# Patient Record
Sex: MALE | Race: WHITE | NOT HISPANIC OR LATINO | Employment: UNEMPLOYED | URBAN - NONMETROPOLITAN AREA
[De-identification: names, ages, dates, MRNs, and addresses within clinical notes are randomized per-mention and may not be internally consistent; named-entity substitution may affect disease eponyms.]

---

## 2017-01-07 ENCOUNTER — COMMUNICATION - GICH (OUTPATIENT)
Dept: INTERNAL MEDICINE | Facility: OTHER | Age: 57
End: 2017-01-07

## 2017-01-07 DIAGNOSIS — J32.9 CHRONIC SINUSITIS: ICD-10-CM

## 2017-01-07 DIAGNOSIS — F33.2 MAJOR DEPRESSIVE DISORDER, RECURRENT SEVERE WITHOUT PSYCHOTIC FEATURES (H): ICD-10-CM

## 2017-01-09 ENCOUNTER — COMMUNICATION - GICH (OUTPATIENT)
Dept: INTERNAL MEDICINE | Facility: OTHER | Age: 57
End: 2017-01-09

## 2017-01-09 DIAGNOSIS — J32.9 CHRONIC SINUSITIS: ICD-10-CM

## 2017-02-27 ENCOUNTER — HISTORY (OUTPATIENT)
Dept: INTERNAL MEDICINE | Facility: OTHER | Age: 57
End: 2017-02-27

## 2017-02-27 ENCOUNTER — OFFICE VISIT - GICH (OUTPATIENT)
Dept: INTERNAL MEDICINE | Facility: OTHER | Age: 57
End: 2017-02-27

## 2017-02-27 DIAGNOSIS — J32.9 CHRONIC SINUSITIS: ICD-10-CM

## 2017-02-27 DIAGNOSIS — F41.9 ANXIETY DISORDER: ICD-10-CM

## 2017-02-27 DIAGNOSIS — E78.5 HYPERLIPIDEMIA: ICD-10-CM

## 2017-02-27 DIAGNOSIS — F33.2 MAJOR DEPRESSIVE DISORDER, RECURRENT SEVERE WITHOUT PSYCHOTIC FEATURES (H): ICD-10-CM

## 2017-02-27 DIAGNOSIS — F33.42 MAJOR DEPRESSIVE DISORDER, RECURRENT, IN FULL REMISSION (H): ICD-10-CM

## 2017-02-27 DIAGNOSIS — R35.0 FREQUENCY OF MICTURITION: ICD-10-CM

## 2017-02-27 LAB
A/G RATIO - HISTORICAL: 1.6 (ref 1–2)
ABSOLUTE BASOPHILS - HISTORICAL: 0.1 THOU/CU MM
ABSOLUTE EOSINOPHILS - HISTORICAL: 0.2 THOU/CU MM
ABSOLUTE LYMPHOCYTES - HISTORICAL: 2.2 THOU/CU MM (ref 0.9–2.9)
ABSOLUTE MONOCYTES - HISTORICAL: 0.7 THOU/CU MM
ABSOLUTE NEUTROPHILS - HISTORICAL: 6.8 THOU/CU MM (ref 1.7–7)
ALBUMIN SERPL-MCNC: 4.6 G/DL (ref 3.5–5.7)
ALP SERPL-CCNC: 73 IU/L (ref 34–104)
ALT (SGPT) - HISTORICAL: 17 IU/L (ref 7–52)
ANION GAP - HISTORICAL: 14 (ref 5–18)
AST SERPL-CCNC: 17 IU/L (ref 13–39)
BACTERIA URINE: NORMAL BACTERIA/HPF
BASOPHILS # BLD AUTO: 1.2 %
BILIRUB SERPL-MCNC: 0.5 MG/DL (ref 0.3–1)
BILIRUB UR QL: NEGATIVE
BUN SERPL-MCNC: 19 MG/DL (ref 7–25)
BUN/CREAT RATIO - HISTORICAL: 20
CALCIUM SERPL-MCNC: 10 MG/DL (ref 8.6–10.3)
CHLORIDE SERPLBLD-SCNC: 103 MMOL/L (ref 98–107)
CHOL/HDL RATIO - HISTORICAL: 3.55
CHOLESTEROL TOTAL: 199 MG/DL
CLARITY, URINE: CLEAR CLARITY
CO2 SERPL-SCNC: 24 MMOL/L (ref 21–31)
COLOR UR: YELLOW COLOR
CREAT SERPL-MCNC: 0.94 MG/DL (ref 0.7–1.3)
EOSINOPHIL NFR BLD AUTO: 1.6 %
EPITHELIAL CELLS: NORMAL EPI/HPF
ERYTHROCYTE [DISTWIDTH] IN BLOOD BY AUTOMATED COUNT: 12.3 % (ref 11.5–15.5)
GFR IF NOT AFRICAN AMERICAN - HISTORICAL: >60 ML/MIN/1.73M2
GLOBULIN - HISTORICAL: 2.9 G/DL (ref 2–3.7)
GLUCOSE SERPL-MCNC: 102 MG/DL (ref 70–105)
GLUCOSE URINE: NEGATIVE MG/DL
HCT VFR BLD AUTO: 43.2 % (ref 37–53)
HDLC SERPL-MCNC: 56 MG/DL (ref 23–92)
HEMOGLOBIN: 14.6 G/DL (ref 13.5–17.5)
KETONES UR QL: NEGATIVE MG/DL
LDLC SERPL CALC-MCNC: 127 MG/DL
LEUKOCYTE ESTERASE URINE: NEGATIVE
LYMPHOCYTES NFR BLD AUTO: 21.7 % (ref 20–44)
MCH RBC QN AUTO: 31 PG (ref 26–34)
MCHC RBC AUTO-ENTMCNC: 33.8 G/DL (ref 32–36)
MCV RBC AUTO: 92 FL (ref 80–100)
MONOCYTES NFR BLD AUTO: 6.8 %
NEUTROPHILS NFR BLD AUTO: 68.7 % (ref 42–72)
NITRITE UR QL STRIP: NEGATIVE
NON-HDL CHOLESTEROL - HISTORICAL: 143 MG/DL
OCCULT BLOOD,URINE - HISTORICAL: ABNORMAL
PATIENT STATUS - HISTORICAL: ABNORMAL
PH UR: 5.5 [PH]
PLATELET # BLD AUTO: 320 THOU/CU MM (ref 140–440)
PMV BLD: 8.1 FL (ref 6.5–11)
POTASSIUM SERPL-SCNC: 4.4 MMOL/L (ref 3.5–5.1)
PROT SERPL-MCNC: 7.5 G/DL (ref 6.4–8.9)
PROTEIN QUALITATIVE,URINE - HISTORICAL: NEGATIVE MG/DL
RBC - HISTORICAL: NORMAL /HPF
RED BLOOD COUNT - HISTORICAL: 4.71 MIL/CU MM (ref 4.3–5.9)
SODIUM SERPL-SCNC: 141 MMOL/L (ref 133–143)
SP GR UR STRIP: >=1.03
TRIGL SERPL-MCNC: 82 MG/DL
UROBILINOGEN,QUALITATIVE - HISTORICAL: NORMAL EU/DL
WBC - HISTORICAL: NORMAL /HPF
WHITE BLOOD COUNT - HISTORICAL: 9.9 THOU/CU MM (ref 4.5–11)

## 2017-04-11 ENCOUNTER — COMMUNICATION - GICH (OUTPATIENT)
Dept: INTERNAL MEDICINE | Facility: OTHER | Age: 57
End: 2017-04-11

## 2017-04-11 DIAGNOSIS — J32.9 CHRONIC SINUSITIS: ICD-10-CM

## 2018-01-02 NOTE — TELEPHONE ENCOUNTER
Patient Information     Patient Name MRN Beau Giordano [id number] Male 1960      Telephone Encounter by Kiera Cole RN at 2017  8:02 AM     Author:  Kiera Cole RN Service:  (none) Author Type:  NURS- Registered Nurse     Filed:  2017  8:06 AM Encounter Date:  2017 Status:  Signed     :  Kiera Cole RN (NURS- Registered Nurse)            Claritin refilled 16.  Unable to complete prescription refill per RN Medication Refill Policy.................... KIERA COLE RN ....................  2017   8:02 AM        Depression-in adults 18 and over    Office visit in the past 12 months or as indicated in chart.  Should have clinic visit 1-2 months after initial prescription.    Last visit with CASI BOWENS was on: 2015 in [facility]  Next visit with CASI BOWENS is on: No future appointment listed with this provider  Next visit with Internal Medicine is on: No future appointment listed in this department  Patient is due for appointment, letter sent.  Max refills 12 months from last office visit or per providers notes.  Prescription refilled per RN Medication Refill Policy.................... KIERA COLE RN ....................  2017   8:02 AM

## 2018-01-02 NOTE — TELEPHONE ENCOUNTER
Patient Information     Patient Name MRN Beau Giordano [id number] Male 1960      Telephone Encounter by Kiera Cole RN at 2017 12:03 PM     Author:  Kiera Cole RN Service:  (none) Author Type:  NURS- Registered Nurse     Filed:  2017 12:06 PM Encounter Date:  2017 Status:  Signed     :  Kiera Cole RN (NURS- Registered Nurse)            Antihistamines:    Office visit in the past 12 months.    Last visit with CASI BOWENS was on: 2015 in [facility]  Next visit with CASI BOWENS is on: No future appointment listed with this provider  Next visit with Internal Medicine is on: No future appointment listed in this department  Patient needs appointment , letter sent.  Max refills 12 months from last office visit.  Prescription refilled per RN Medication Refill Policy.................... KIERA COLE RN ....................  2017   12:04 PM

## 2018-01-03 NOTE — PROGRESS NOTES
Patient Information     Patient Name MRN Beau Giordano [id number] Male 1960      Progress Notes by Jose David Yen MD at 2017  8:30 AM     Author:  Jose David Yen MD Service:  (none) Author Type:  Physician     Filed:  2017  8:59 AM Encounter Date:  2017 Status:  Signed     :  Jose David Yen MD (Physician)            SUBJECTIVE:    Beau Koch is a 56 y.o. male who presents for comprehensive review of their multiple medical problems and review of medications, renewal of medications and update on necessary health maintenance issues.      HPI Comments: He is here today for a physical. He is feeling well in most respects. His sinuses are bothering him somewhat which does cause him to have some dizziness. He has a hard time getting his over-the-counter nasal spray and his Claritin. For any strength work on that. His other medications remain the same including as well be returned and his cholesterol-lowering medication. He quit smoking about 10 months ago, he was congratulated on that. He is not drinking any alcohol and he is not using any drugs. 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 His mother  within the last year, he had previously been living with her.    He is having some troubles with increased urinary frequency. He wonders what that might be from. He has no other complaints or concerns. I spent time today updating his past medical history, past surgical history, family history and social history. He is up-to-date with all health measures.      No Known Allergies,   Current Outpatient Prescriptions     Medication  Sig     atorvastatin (LIPITOR) 20 mg tablet Take 1 tablet by mouth once daily.     buPROPion (WELLBUTRIN XL) 150 mg Extended-Release tablet TAKE ONE TABLET BY MOUTH ONCE DAILY     loratadine (CLARITIN) 10 mg tablet TAKE ONE TABLET BY MOUTH ONCE DAILY     multivitamin (MVI) tablet Take 1 tablet by mouth once daily.     No current facility-administered  medications for this visit.      Medications have been reviewed by me and are current to the best of my knowledge and ability. ,   Past Medical History      Diagnosis   Date     Alcohol abuse, in remission       Quit at age 26      Cigarette smoker       Quit       Depression       Hyperlipidemia     ,   Patient Active Problem List      Diagnosis Date Noted     Sinusitis 2015     Anxiety 2014     Hyperlipidemia 10/14/2013     Drug abuse 10/14/2013     Major depression, recurrent (HC) 2013     Cigarette smoker 2013   ,   Past Surgical History       Procedure   Laterality Date     Hydrocelectomy        Colonoscopy screening        WNL      and   Social History      Substance Use Topics        Smoking status:  Former Smoker     Packs/day: 0.50     Types: Cigarettes     Quit date: 2016     Smokeless tobacco:  Never Used     Alcohol use  No     Family Status     Relation  Status     Father     COPD, blood clots      Mother     HTN, Hyperlipidemia, osteoporosis, depression      Sister Alive    Breast cancer      Sister Alive    Breast cancer      Sister Alive     Sister Alive     Sister Alive     Sister Alive     Sister Alive     Brother  at age 53    MI      Brother Alive     Brother Alive     Brother Alive     Brother Alive     Social History     Social History        Marital status:  Single     Spouse name: N/A     Number of children:  N/A     Years of education:  N/A     Social History Main Topics         Smoking status:   Former Smoker     Packs/day:  0.50     Types:  Cigarettes     Quit date:  2016     Smokeless tobacco:   Never Used     Alcohol use   No     Drug use:   No      Comment: Methamphetamine.  Quit [id number]      Sexual activity:   Not Asked     Other Topics  Concern     None      Social History Narrative     Single, lived with mother in [place], she is .  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                          "      REVIEW OF SYSTEMS:  Review of Systems   Constitutional: Negative for chills, diaphoresis, fever, malaise/fatigue and weight loss.   HENT: Negative for congestion, ear pain, nosebleeds, sore throat and tinnitus.    Eyes: Negative for blurred vision, double vision, photophobia, pain, discharge and redness.   Respiratory: Negative for cough, hemoptysis, sputum production, shortness of breath and wheezing.    Cardiovascular: Negative for chest pain, palpitations, orthopnea, claudication, leg swelling and PND.   Gastrointestinal: Negative for abdominal pain, blood in stool, constipation, diarrhea, heartburn, nausea and vomiting.   Genitourinary: Positive for frequency. Negative for dysuria, flank pain and hematuria.   Musculoskeletal: Negative for back pain, joint pain, myalgias and neck pain.   Skin: Negative for itching and rash.   Neurological: Positive for dizziness. Negative for tingling, tremors, speech change, loss of consciousness, weakness and headaches.   Psychiatric/Behavioral: Negative for depression, hallucinations, memory loss, substance abuse and suicidal ideas. The patient is not nervous/anxious.        OBJECTIVE:  /76  Pulse 68  Ht 1.645 m (5' 4.75\")  Wt 77.9 kg (171 lb 12.8 oz)  BMI 28.81 kg/m2    EXAM:   Physical Exam   Constitutional: He is oriented to person, place, and time and well-developed, well-nourished, and in no distress. No distress.   HENT:   Head: Normocephalic and atraumatic.   Right Ear: Tympanic membrane and external ear normal.   Left Ear: Tympanic membrane and external ear normal.   Nose: Nose normal.   Mouth/Throat: Oropharynx is clear and moist and mucous membranes are normal. No oropharyngeal exudate.   Chronic left TM perforation   Eyes: Conjunctivae are normal. Pupils are equal, round, and reactive to light. No scleral icterus.   Neck: Normal range of motion. Neck supple. Normal carotid pulses, no hepatojugular reflux and no JVD present. Carotid bruit is not present. " No tracheal deviation and no edema present. No thyroid mass and no thyromegaly present.   Cardiovascular: Normal rate, regular rhythm, normal heart sounds and intact distal pulses.  Exam reveals no gallop and no friction rub.    No murmur heard.  Pulmonary/Chest: Effort normal and breath sounds normal. No respiratory distress. He has no decreased breath sounds. He has no wheezes. He has no rhonchi. He has no rales. He exhibits no tenderness.   Abdominal: Soft. Bowel sounds are normal. He exhibits no distension and no mass. There is no hepatosplenomegaly. There is no tenderness. There is no rebound and no guarding. Hernia confirmed negative in the right inguinal area and confirmed negative in the left inguinal area.   Genitourinary: Rectum normal, prostate normal, testes/scrotum normal and penis normal.   Genitourinary Comments: Left hydrocele   Musculoskeletal: Normal range of motion. He exhibits no edema or tenderness.   Lymphadenopathy:     He has no cervical adenopathy.   Neurological: He is alert and oriented to person, place, and time. He has normal motor skills. He displays normal reflexes. No cranial nerve deficit. He exhibits normal muscle tone. Gait normal. Coordination normal.   Skin: Skin is warm and dry. No rash noted. He is not diaphoretic. No cyanosis or erythema. No pallor. Nails show no clubbing.   Psychiatric: Mood, memory, affect and judgment normal.   Nursing note and vitals reviewed.      ASSESSMENT/PLAN:    ICD-10-CM    1. Hyperlipidemia, unspecified hyperlipidemia type E78.5 atorvastatin (LIPITOR) 20 mg tablet      COMP METABOLIC PANEL      LIPID PANEL   2. Anxiety F41.9    3. Recurrent major depressive disorder, in full remission (HC) F33.42    4. Chronic sinusitis, unspecified location J32.9 fluticasone (50 mcg per actuation) nasal solution (FLONASE)   5. Major depressive disorder, recurrent, severe without psychotic features (HC) F33.2 buPROPion (WELLBUTRIN XL) 150 mg Extended-Release tablet    6. Urine frequency R35.0 URINALYSIS W REFLEX MICROSCOPIC IF POSITIVE      CBC AND DIFFERENTIAL        Plan:  Aside from his hydrocele, his exam today is unremarkable. I have no explanation for his dizzy spells unless this is related to sinus problems. Complete lab is drawn and pending, I will send him a letter with the results. This also includes a urinalysis because of his complaints of urine frequency. He will try the Claritin and Flonase on a regular basis and if his dizziness does not improve he will need to return for reevaluation. Otherwise everything is up-to-date and he will follow up annually. Congratulated on his discontinuance of smoking. He will continue with his chronic lipid-lowering therapy and Wellbutrin therapy.

## 2018-01-03 NOTE — NURSING NOTE
Patient Information     Patient Name MRBeau Benson [id number] Male 1960      Nursing Note by Ana Lehman LPN at 2017  8:30 AM     Author:  Ana Lehman LPN Service:  (none) Author Type:  NURS- Licensed Practical Nurse     Filed:  2017  8:39 AM Encounter Date:  2017 Status:  Signed     :  Ana Lehman LPN (NURS- Licensed Practical Nurse)            The patient is here today to have a yearly check up and get refills on his medications.  Ana Lehman LPN......2017  8:27 AM

## 2018-01-04 NOTE — TELEPHONE ENCOUNTER
Patient Information     Patient Name MRN Beau Giordano [id number] Male 1960      Telephone Encounter by Yohannes Arreguin RPh at 2017  4:13 PM     Author:  Yohannes Arreguin RPh Service:  (none) Author Type:  PHARM- Pharmacist     Filed:  2017  4:16 PM Encounter Date:  2017 Status:  Signed     :  Yohannes Arreguin RPh (PHARM- Pharmacist)            Loratadine 10mg 1 po qd, #60, refill prn

## 2018-01-04 NOTE — TELEPHONE ENCOUNTER
Patient Information     Patient Name MRN Beau Giordano [id number] Male 1960      Telephone Encounter by Jose David Yen MD at 2017  4:40 PM     Author:  Jose David Yen MD Service:  (none) Author Type:  Physician     Filed:  2017  4:40 PM Encounter Date:  2017 Status:  Signed     :  Jose David Yen MD (Physician)            Kami

## 2018-01-27 VITALS
HEART RATE: 68 BPM | BODY MASS INDEX: 28.62 KG/M2 | DIASTOLIC BLOOD PRESSURE: 76 MMHG | HEIGHT: 65 IN | SYSTOLIC BLOOD PRESSURE: 108 MMHG | WEIGHT: 171.8 LBS

## 2018-02-16 ENCOUNTER — DOCUMENTATION ONLY (OUTPATIENT)
Dept: FAMILY MEDICINE | Facility: OTHER | Age: 58
End: 2018-02-16

## 2018-02-16 RX ORDER — FLUTICASONE PROPIONATE 50 MCG
1 SPRAY, SUSPENSION (ML) NASAL DAILY
COMMUNITY
Start: 2017-02-27 | End: 2018-12-31

## 2018-02-16 RX ORDER — DIPHENOXYLATE HYDROCHLORIDE AND ATROPINE SULFATE 2.5; .025 MG/1; MG/1
1 TABLET ORAL DAILY
COMMUNITY
Start: 2015-12-17 | End: 2020-01-09

## 2018-02-16 RX ORDER — LORATADINE 10 MG/1
TABLET ORAL
COMMUNITY
Start: 2017-04-11 | End: 2018-12-31

## 2018-02-16 RX ORDER — ATORVASTATIN CALCIUM 20 MG/1
20 TABLET, FILM COATED ORAL DAILY
COMMUNITY
Start: 2017-02-27 | End: 2018-12-31

## 2018-02-16 RX ORDER — BUPROPION HYDROCHLORIDE 150 MG/1
150 TABLET ORAL DAILY
COMMUNITY
Start: 2017-02-27 | End: 2018-12-31

## 2018-07-24 NOTE — PROGRESS NOTES
Patient Information     Patient Name  Beau Koch MRN  [id number] Sex  Male   1960      Letter by Jose David Yen MD at      Author:  Jose David Yen MD Service:  (none) Author Type:  (none)    Filed:   Encounter Date:  2017 Status:  (Other)           Beau Koch  [address] [facility] [address]          2017    Dear Mr. Koch:    Following are the tests completed during your last clinic visit:    Results for orders placed or performed in visit on 17      COMP METABOLIC PANEL      Result  Value Ref Range    SODIUM 141 133 - 143 mmol/L    POTASSIUM 4.4 3.5 - 5.1 mmol/L    CHLORIDE 103 98 - 107 mmol/L    CO2,TOTAL 24 21 - 31 mmol/L    ANION GAP 14 5 - 18                    GLUCOSE 102 70 - 105 mg/dL    CALCIUM 10.0 8.6 - 10.3 mg/dL    BUN 19 7 - 25 mg/dL    CREATININE 0.94 0.70 - 1.30 mg/dL    BUN/CREAT RATIO           20                    GFR if African American >60 >60 ml/min/1.73m2    GFR if not African American >60 >60 ml/min/1.73m2    ALBUMIN 4.6 3.5 - 5.7 g/dL    PROTEIN,TOTAL 7.5 6.4 - 8.9 g/dL    GLOBULIN                  2.9 2.0 - 3.7 g/dL    A/G RATIO 1.6 1.0 - 2.0                    BILIRUBIN,TOTAL 0.5 0.3 - 1.0 mg/dL    ALK PHOSPHATASE 73 34 - 104 IU/L    ALT (SGPT) 17 7 - 52 IU/L    AST (SGOT) 17 13 - 39 IU/L   LIPID PANEL      Result  Value Ref Range    CHOLESTEROL,TOTAL 199 <200 mg/dL    TRIGLYCERIDES 82 <150 mg/dL    HDL CHOLESTEROL 56 23 - 92 mg/dL    NON-HDL CHOLESTEROL 143 <145 mg/dl    CHOL/HDL RATIO            3.55 <4.50                    LDL CHOLESTEROL 127 (H) <100 mg/dL    PATIENT STATUS            NON-FASTING                   CBC WITH AUTO DIFFERENTIAL      Result  Value Ref Range    WHITE BLOOD COUNT         9.9 4.5 - 11.0 thou/cu mm    RED BLOOD COUNT           4.71 4.30 - 5.90 mil/cu mm    HEMOGLOBIN                14.6 13.5 - 17.5 g/dL    HEMATOCRIT                43.2 37.0 - 53.0 %    MCV                       92 80 - 100 fL    MCH                        31.0 26.0 - 34.0 pg    MCHC                      33.8 32.0 - 36.0 g/dL    RDW                       12.3 11.5 - 15.5 %    PLATELET COUNT            320 140 - 440 thou/cu mm    MPV                       8.1 6.5 - 11.0 fL    NEUTROPHILS               68.7 42.0 - 72.0 %    LYMPHOCYTES               21.7 20.0 - 44.0 %    MONOCYTES                 6.8 <12.0 %    EOSINOPHILS               1.6 <8.0 %    BASOPHILS                 1.2 <3.0 %    ABSOLUTE NEUTROPHILS      6.8 1.7 - 7.0 thou/cu mm    ABSOLUTE LYMPHOCYTES      2.2 0.9 - 2.9 thou/cu mm    ABSOLUTE MONOCYTES        0.7 <0.9 thou/cu mm    ABSOLUTE EOSINOPHILS      0.2 <0.5 thou/cu mm    ABSOLUTE BASOPHILS        0.1 <0.3 thou/cu mm   URINALYSIS W REFLEX MICROSCOPIC IF POSITIVE      Result  Value Ref Range    COLOR                     Yellow Yellow Color    CLARITY                   Clear Clear Clarity    SPECIFIC GRAVITY,URINE    >=1.030 (A) 1.010, 1.015, 1.020, 1.025                    PH,URINE                  5.5 6.0, 7.0, 8.0, 5.5, 6.5, 7.5, 8.5                    UROBILINOGEN,QUALITATIVE  Normal Normal EU/dl    PROTEIN, URINE Negative Negative mg/dL    GLUCOSE, URINE Negative Negative mg/dL    KETONES,URINE             Negative Negative mg/dL    BILIRUBIN,URINE           Negative Negative                    OCCULT BLOOD,URINE        Trace (A) Negative                    NITRITE                   Negative Negative                    LEUKOCYTE ESTERASE        Negative Negative                   URINALYSIS MICROSCOPIC      Result  Value Ref Range    RBC 0-2 0-2, None Seen /HPF    WBC None Seen 0-2, 3-5, None Seen /HPF    BACTERIA                  None Seen None Seen, Rare, Occasional, Few Bacteria/HPF    EPITHELIAL CELLS          None Seen None Seen, Few Epi/HPF         Your blood and urine tests look great. Congratulations on this excellent report. If you are still having troubles with dizziness or other problems, be sure to return so that we can  review that further.    Sincerely,      Jose David Yen MD  Internal Medicine  [facility]

## 2018-07-24 NOTE — PROGRESS NOTES
Patient Information     Patient Name  Beau Koch MRN  [id number] Sex  Male   1960      Letter by Jose David Yen MD at      Author:  Jose David eYn MD Service:  (none) Author Type:  (none)    Filed:   Encounter Date:  2017 Status:  (Other)           Beau Koch  [address] [place] [address]          2017    Dear Mr. Koch:    A refill of     loratadine (CLARITIN) 10 mg tablet has been called into your pharmacy.    Additional refills require an appointment with Jose David Yen MD  Please call the clinic at [phone] to schedule your appointment.    Thank you,    The Refill Nurse  [facility]

## 2018-12-31 ENCOUNTER — OFFICE VISIT (OUTPATIENT)
Dept: INTERNAL MEDICINE | Facility: OTHER | Age: 58
End: 2018-12-31
Attending: INTERNAL MEDICINE
Payer: MEDICAID

## 2018-12-31 VITALS
DIASTOLIC BLOOD PRESSURE: 76 MMHG | HEIGHT: 65 IN | WEIGHT: 165.2 LBS | SYSTOLIC BLOOD PRESSURE: 110 MMHG | HEART RATE: 84 BPM | BODY MASS INDEX: 27.52 KG/M2 | RESPIRATION RATE: 18 BRPM

## 2018-12-31 DIAGNOSIS — J32.9 CHRONIC SINUSITIS, UNSPECIFIED LOCATION: ICD-10-CM

## 2018-12-31 DIAGNOSIS — F17.210 CIGARETTE SMOKER: ICD-10-CM

## 2018-12-31 DIAGNOSIS — L91.8 SKIN TAG: ICD-10-CM

## 2018-12-31 DIAGNOSIS — E78.5 HYPERLIPIDEMIA, UNSPECIFIED HYPERLIPIDEMIA TYPE: Primary | ICD-10-CM

## 2018-12-31 DIAGNOSIS — F33.41 RECURRENT MAJOR DEPRESSIVE DISORDER, IN PARTIAL REMISSION (H): ICD-10-CM

## 2018-12-31 DIAGNOSIS — F19.10 DRUG ABUSE (H): ICD-10-CM

## 2018-12-31 LAB
ALBUMIN SERPL-MCNC: 4.6 G/DL (ref 3.5–5.7)
ALP SERPL-CCNC: 59 U/L (ref 34–104)
ALT SERPL W P-5'-P-CCNC: 32 U/L (ref 7–52)
ANION GAP SERPL CALCULATED.3IONS-SCNC: 5 MMOL/L (ref 3–14)
AST SERPL W P-5'-P-CCNC: 21 U/L (ref 13–39)
BILIRUB SERPL-MCNC: 0.5 MG/DL (ref 0.3–1)
BUN SERPL-MCNC: 18 MG/DL (ref 7–25)
CALCIUM SERPL-MCNC: 9.7 MG/DL (ref 8.6–10.3)
CHLORIDE SERPL-SCNC: 103 MMOL/L (ref 98–107)
CHOLEST SERPL-MCNC: 274 MG/DL
CO2 SERPL-SCNC: 31 MMOL/L (ref 21–31)
CREAT SERPL-MCNC: 0.9 MG/DL (ref 0.7–1.3)
ERYTHROCYTE [DISTWIDTH] IN BLOOD BY AUTOMATED COUNT: 13.6 % (ref 10–15)
GFR SERPL CREATININE-BSD FRML MDRD: 87 ML/MIN/{1.73_M2}
GLUCOSE SERPL-MCNC: 86 MG/DL (ref 70–105)
HCT VFR BLD AUTO: 43.2 % (ref 40–53)
HDLC SERPL-MCNC: 58 MG/DL (ref 23–92)
HGB BLD-MCNC: 14.3 G/DL (ref 13.3–17.7)
LDLC SERPL CALC-MCNC: 172 MG/DL
MCH RBC QN AUTO: 31 PG (ref 26.5–33)
MCHC RBC AUTO-ENTMCNC: 33.1 G/DL (ref 31.5–36.5)
MCV RBC AUTO: 94 FL (ref 78–100)
NONHDLC SERPL-MCNC: 216 MG/DL
PLATELET # BLD AUTO: 313 10E9/L (ref 150–450)
POTASSIUM SERPL-SCNC: 4.7 MMOL/L (ref 3.5–5.1)
PROT SERPL-MCNC: 7.3 G/DL (ref 6.4–8.9)
RBC # BLD AUTO: 4.62 10E12/L (ref 4.4–5.9)
SODIUM SERPL-SCNC: 139 MMOL/L (ref 134–144)
TRIGL SERPL-MCNC: 219 MG/DL
WBC # BLD AUTO: 7.8 10E9/L (ref 4–11)

## 2018-12-31 PROCEDURE — 80061 LIPID PANEL: CPT | Performed by: INTERNAL MEDICINE

## 2018-12-31 PROCEDURE — 11200 RMVL SKIN TAGS UP TO&INC 15: CPT | Performed by: INTERNAL MEDICINE

## 2018-12-31 PROCEDURE — 99215 OFFICE O/P EST HI 40 MIN: CPT | Performed by: INTERNAL MEDICINE

## 2018-12-31 PROCEDURE — 85027 COMPLETE CBC AUTOMATED: CPT | Performed by: INTERNAL MEDICINE

## 2018-12-31 PROCEDURE — 80053 COMPREHEN METABOLIC PANEL: CPT | Performed by: INTERNAL MEDICINE

## 2018-12-31 PROCEDURE — G0463 HOSPITAL OUTPT CLINIC VISIT: HCPCS

## 2018-12-31 PROCEDURE — G0472 HEP C SCREEN HIGH RISK/OTHER: HCPCS | Performed by: INTERNAL MEDICINE

## 2018-12-31 PROCEDURE — 36415 COLL VENOUS BLD VENIPUNCTURE: CPT | Performed by: INTERNAL MEDICINE

## 2018-12-31 RX ORDER — ATORVASTATIN CALCIUM 20 MG/1
20 TABLET, FILM COATED ORAL DAILY
Qty: 90 TABLET | Refills: 3 | Status: SHIPPED | OUTPATIENT
Start: 2018-12-31 | End: 2020-01-17

## 2018-12-31 RX ORDER — BUPROPION HYDROCHLORIDE 150 MG/1
150 TABLET ORAL DAILY
Qty: 90 TABLET | Refills: 3 | Status: SHIPPED | OUTPATIENT
Start: 2018-12-31 | End: 2020-10-20

## 2018-12-31 RX ORDER — FLUTICASONE PROPIONATE 50 MCG
1 SPRAY, SUSPENSION (ML) NASAL DAILY
Qty: 3 BOTTLE | Refills: 3 | Status: SHIPPED | OUTPATIENT
Start: 2018-12-31 | End: 2020-01-09

## 2018-12-31 RX ORDER — LORATADINE 10 MG/1
1 TABLET ORAL DAILY
Qty: 90 TABLET | Refills: 3 | Status: SHIPPED | OUTPATIENT
Start: 2018-12-31 | End: 2020-10-20

## 2018-12-31 ASSESSMENT — ENCOUNTER SYMPTOMS
HALLUCINATIONS: 0
HEADACHES: 0
DIFFICULTY URINATING: 0
SORE THROAT: 0
PALPITATIONS: 0
TREMORS: 0
SHORTNESS OF BREATH: 0
DIARRHEA: 0
CONFUSION: 0
FLANK PAIN: 0
FATIGUE: 0
CONSTIPATION: 0
BACK PAIN: 0
LIGHT-HEADEDNESS: 0
SLEEP DISTURBANCE: 0
TROUBLE SWALLOWING: 0
DIZZINESS: 0
VOMITING: 0
NERVOUS/ANXIOUS: 0
APPETITE CHANGE: 0
SPEECH DIFFICULTY: 0
BLOOD IN STOOL: 0
DIAPHORESIS: 0
SINUS PRESSURE: 0
WOUND: 0
COUGH: 0
BRUISES/BLEEDS EASILY: 0
NECK STIFFNESS: 0
HEMATURIA: 0
AGITATION: 0
NUMBNESS: 0
ABDOMINAL DISTENTION: 0
EYE REDNESS: 0
NAUSEA: 0
ADENOPATHY: 0
SINUS PAIN: 0
EYE PAIN: 0
WHEEZING: 0
NECK PAIN: 0
DYSURIA: 0
SEIZURES: 0
VOICE CHANGE: 0
UNEXPECTED WEIGHT CHANGE: 0
ABDOMINAL PAIN: 0
CHILLS: 0
JOINT SWELLING: 0
RHINORRHEA: 0
FREQUENCY: 0
CHEST TIGHTNESS: 0
WEAKNESS: 0
FEVER: 0

## 2018-12-31 ASSESSMENT — PATIENT HEALTH QUESTIONNAIRE - PHQ9: SUM OF ALL RESPONSES TO PHQ QUESTIONS 1-9: 12

## 2018-12-31 ASSESSMENT — MIFFLIN-ST. JEOR: SCORE: 1497.25

## 2018-12-31 NOTE — PROGRESS NOTES
Cbc  Chief Complaint:  This patient is here for a comprehensive review of their multiple medical problems, renewal of medications and update on necessary health maintenance issues.      HPI: He is in today for a physical and update on his medications.  He is due for lab work.  He is due for refills.  Unfortunately he is smoking again.  He is also currently in counseling at [facility] because of methamphetamines.  Physically he feels well with no other complaints or concerns.  He still struggles obviously with addiction as well as occasional depression.  No other complaints or concerns other than some skin tags on his face.    Medications are reconciled.  He has been off of them for quite some time so does need refills.  Past medical history, past surgical history, family history and social history reviewed and updated.  He is up-to-date with tetanus, he does not want any other immunizations today.  Colonoscopy is up-to-date.    Past Medical History:   Diagnosis Date     Cigarette nicotine dependence, uncomplicated     Quit 2016     Hyperlipidemia     No Comments Provided     Major depressive disorder, single episode     No Comments Provided     Uncomplicated alcohol abuse        Past Surgical History:   Procedure Laterality Date     COLONOSCOPY  2010    WNL       Current Outpatient Medications   Medication Sig Dispense Refill     atorvastatin (LIPITOR) 20 MG tablet Take 1 tablet (20 mg) by mouth daily 90 tablet 3     buPROPion (WELLBUTRIN XL) 150 MG 24 hr tablet Take 1 tablet (150 mg) by mouth daily 90 tablet 3     fluticasone (FLONASE) 50 MCG/ACT nasal spray Spray 1 spray into both nostrils daily 3 Bottle 3     loratadine (CLARITIN) 10 MG tablet Take 1 tablet (10 mg) by mouth daily 90 tablet 3     Multiple Vitamin (MULTI-VITAMINS) TABS Take 1 tablet by mouth daily         No Known Allergies    Family History   Problem Relation Age of Onset     Emphysema Father      Other - See Comments Father         Blood clots      Hypertension Mother      Hyperlipidemia Mother      Myocardial Infarction Brother        Social History     Socioeconomic History     Marital status: Single     Spouse name: Not on file     Number of children: Not on file     Years of education: Not on file     Highest education level: Not on file   Social Needs     Financial resource strain: Not on file     Food insecurity - worry: Not on file     Food insecurity - inability: Not on file     Transportation needs - medical: Not on file     Transportation needs - non-medical: Not on file   Occupational History     Not on file   Tobacco Use     Smoking status: Current Every Day Smoker     Packs/day: 0.25     Types: Cigarettes     Last attempt to quit: 2016     Years since quittin.9     Smokeless tobacco: Never Used   Substance and Sexual Activity     Alcohol use: No     Alcohol/week: 0.0 oz     Drug use: Yes     Types: Amphetamines     Sexual activity: Not on file   Other Topics Concern     Not on file   Social History Narrative    Single, lived with mother in [place], she is .  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  Currently in counseling for methamphetamine.       Review of Systems   Constitutional: Negative for appetite change, chills, diaphoresis, fatigue, fever and unexpected weight change.   HENT: Negative for ear pain, rhinorrhea, sinus pressure, sinus pain, sore throat, trouble swallowing and voice change.    Eyes: Negative for pain, redness and visual disturbance.   Respiratory: Negative for cough, chest tightness, shortness of breath and wheezing.    Cardiovascular: Negative for chest pain, palpitations and leg swelling.   Gastrointestinal: Negative for abdominal distention, abdominal pain, blood in stool, constipation, diarrhea, nausea and vomiting.   Endocrine: Negative for cold intolerance and heat intolerance.   Genitourinary: Negative for difficulty urinating, dysuria, flank pain, frequency and hematuria.   Musculoskeletal:  Negative for back pain, joint swelling, neck pain and neck stiffness.   Skin: Negative for rash and wound.   Allergic/Immunologic: Negative for immunocompromised state.   Neurological: Negative for dizziness, tremors, seizures, syncope, speech difficulty, weakness, light-headedness, numbness and headaches.   Hematological: Negative for adenopathy. Does not bruise/bleed easily.   Psychiatric/Behavioral: Negative for agitation, behavioral problems, confusion, hallucinations and sleep disturbance. The patient is not nervous/anxious.        Physical Exam   Constitutional: He is oriented to person, place, and time. He appears well-developed and well-nourished. No distress.   HENT:   Head: Normocephalic.   Right Ear: External ear normal.   Left Ear: External ear normal.   Nose: Nose normal.   Mouth/Throat: Oropharynx is clear and moist. No oropharyngeal exudate.   Eyes: Conjunctivae are normal. Pupils are equal, round, and reactive to light.   Neck: Normal range of motion. Neck supple. Normal carotid pulses and no JVD present. Carotid bruit is not present. No tracheal deviation present. No thyromegaly present.   Cardiovascular: Normal rate, regular rhythm and normal heart sounds. Exam reveals no gallop and no friction rub.   No murmur heard.  Pulmonary/Chest: Effort normal and breath sounds normal. No stridor. No respiratory distress. He has no wheezes. He has no rales.   Abdominal: Soft. Bowel sounds are normal. He exhibits no distension and no mass. There is no tenderness. There is no rebound and no guarding. No hernia.   Genitourinary: Rectum normal, prostate normal and penis normal.   Musculoskeletal: Normal range of motion. He exhibits no edema.   Lymphadenopathy:     He has no cervical adenopathy.   Neurological: He is alert and oriented to person, place, and time. He displays normal reflexes. No cranial nerve deficit or sensory deficit. He exhibits normal muscle tone. Coordination normal.   Skin: Skin is warm and  dry. No rash noted. He is not diaphoretic.   He has a skin tag on either side of his eye.  After informed consent and a timeout, these were removed after sterile prep, a small amount of lidocaine with epinephrine, each was snipped off and the base hyfrecated.   Psychiatric: He has a normal mood and affect.   Nursing note and vitals reviewed.      Assessment:      ICD-10-CM    1. Hyperlipidemia, unspecified hyperlipidemia type E78.5 Comprehensive Metabolic Panel     Lipid Panel     atorvastatin (LIPITOR) 20 MG tablet   2. Chronic sinusitis, unspecified location J32.9 fluticasone (FLONASE) 50 MCG/ACT nasal spray     loratadine (CLARITIN) 10 MG tablet   3. Recurrent major depressive disorder, in partial remission (H) F33.41 buPROPion (WELLBUTRIN XL) 150 MG 24 hr tablet   4. Cigarette smoker F17.210 CBC W PLT No Diff   5. Drug abuse (H) F19.10 Hepatitis C antibody   6. Skin tag L91.8 REMOVAL OF SKIN TAGS, FIRST 15        Plan: He will follow-up on the skin tags as needed.  Medications will continue, they were refilled for him today.  Encouraged him to discontinue smoking.  Continue with his treatment for methamphetamine.  Lab is pending, I will send him a letter with the results.

## 2018-12-31 NOTE — NURSING NOTE
"The patient is here today to get a refill on his medications.  Ana Lehman on 12/31/2018 at 7:55 AM  Chief Complaint   Patient presents with     Recheck Medication       Initial /76 (BP Location: Right arm, Patient Position: Sitting, Cuff Size: Adult Large)   Pulse 84   Resp 18   Ht 1.645 m (5' 4.75\")   Wt 74.9 kg (165 lb 3.2 oz)   BMI 27.70 kg/m   Estimated body mass index is 27.7 kg/m  as calculated from the following:    Height as of this encounter: 1.645 m (5' 4.75\").    Weight as of this encounter: 74.9 kg (165 lb 3.2 oz).  Medication Reconciliation: incomplete    Ana Lehman    "

## 2018-12-31 NOTE — LETTER
January 2, 2019      Beau Koch  General Delivery  [address]  [place] [address]        Dear Beau Koch,    Below are the results of your recent labs:    Results for orders placed or performed in visit on 12/31/18   CBC W PLT No Diff   Result Value Ref Range    WBC 7.8 4.0 - 11.0 10e9/L    RBC Count 4.62 4.4 - 5.9 10e12/L    Hemoglobin 14.3 13.3 - 17.7 g/dL    Hematocrit 43.2 40.0 - 53.0 %    MCV 94 78 - 100 fl    MCH 31.0 26.5 - 33.0 pg    MCHC 33.1 31.5 - 36.5 g/dL    RDW 13.6 10.0 - 15.0 %    Platelet Count 313 150 - 450 10e9/L   Hepatitis C antibody   Result Value Ref Range    Hepatitis C Antibody Nonreactive NR^Nonreactive   Lipid Panel   Result Value Ref Range    Cholesterol 274 (H) <200 mg/dL    Triglycerides 219 (H) <150 mg/dL    HDL Cholesterol 58 23 - 92 mg/dL    LDL Cholesterol Calculated 172 (H) <100 mg/dL    Non HDL Cholesterol 216 (H) <130 mg/dL   Comprehensive Metabolic Panel   Result Value Ref Range    Sodium 139 134 - 144 mmol/L    Potassium 4.7 3.5 - 5.1 mmol/L    Chloride 103 98 - 107 mmol/L    Carbon Dioxide 31 21 - 31 mmol/L    Anion Gap 5 3 - 14 mmol/L    Glucose 86 70 - 105 mg/dL    Urea Nitrogen 18 7 - 25 mg/dL    Creatinine 0.90 0.70 - 1.30 mg/dL    GFR Estimate 87 >60 mL/min/[1.73_m2]    GFR Estimate If Black >90 >60 mL/min/[1.73_m2]    Calcium 9.7 8.6 - 10.3 mg/dL    Bilirubin Total 0.5 0.3 - 1.0 mg/dL    Albumin 4.6 3.5 - 5.7 g/dL    Protein Total 7.3 6.4 - 8.9 g/dL    Alkaline Phosphatase 59 34 - 104 U/L    ALT 32 7 - 52 U/L    AST 21 13 - 39 U/L        Your cholesterol is very high.  Make sure that you get back on your medication and stay on this.  Everything else looks normal.  If you have any questions about your results, feel free to contact me.    Sincerely,        Jose David Yen MD  Internal Medicine  [facility]

## 2019-01-10 ENCOUNTER — OFFICE VISIT (OUTPATIENT)
Dept: FAMILY MEDICINE | Facility: OTHER | Age: 59
End: 2019-01-10
Attending: NURSE PRACTITIONER
Payer: MEDICAID

## 2019-01-10 VITALS
HEART RATE: 100 BPM | SYSTOLIC BLOOD PRESSURE: 110 MMHG | TEMPERATURE: 96.3 F | WEIGHT: 172 LBS | BODY MASS INDEX: 28.84 KG/M2 | DIASTOLIC BLOOD PRESSURE: 80 MMHG

## 2019-01-10 DIAGNOSIS — H61.21 IMPACTED CERUMEN OF RIGHT EAR: Primary | ICD-10-CM

## 2019-01-10 PROCEDURE — G0463 HOSPITAL OUTPT CLINIC VISIT: HCPCS | Mod: 25

## 2019-01-10 PROCEDURE — 99213 OFFICE O/P EST LOW 20 MIN: CPT | Performed by: NURSE PRACTITIONER

## 2019-01-10 PROCEDURE — 69209 REMOVE IMPACTED EAR WAX UNI: CPT | Mod: RT | Performed by: NURSE PRACTITIONER

## 2019-01-10 PROCEDURE — G0463 HOSPITAL OUTPT CLINIC VISIT: HCPCS

## 2019-01-10 ASSESSMENT — ANXIETY QUESTIONNAIRES
IF YOU CHECKED OFF ANY PROBLEMS ON THIS QUESTIONNAIRE, HOW DIFFICULT HAVE THESE PROBLEMS MADE IT FOR YOU TO DO YOUR WORK, TAKE CARE OF THINGS AT HOME, OR GET ALONG WITH OTHER PEOPLE: NOT DIFFICULT AT ALL
1. FEELING NERVOUS, ANXIOUS, OR ON EDGE: NOT AT ALL
7. FEELING AFRAID AS IF SOMETHING AWFUL MIGHT HAPPEN: NOT AT ALL
GAD7 TOTAL SCORE: 0
6. BECOMING EASILY ANNOYED OR IRRITABLE: NOT AT ALL
4. TROUBLE RELAXING: NOT AT ALL
3. WORRYING TOO MUCH ABOUT DIFFERENT THINGS: NOT AT ALL
2. NOT BEING ABLE TO STOP OR CONTROL WORRYING: NOT AT ALL
5. BEING SO RESTLESS THAT IT IS HARD TO SIT STILL: NOT AT ALL

## 2019-01-10 ASSESSMENT — PAIN SCALES - GENERAL: PAINLEVEL: MODERATE PAIN (5)

## 2019-01-10 ASSESSMENT — PATIENT HEALTH QUESTIONNAIRE - PHQ9: SUM OF ALL RESPONSES TO PHQ QUESTIONS 1-9: 0

## 2019-01-10 NOTE — PROGRESS NOTES
SUBJECTIVE:   Beau Koch is a 58 year old male who presents to clinic today for the following health issues:    ENT Symptoms             Symptoms: cc Present Absent Comment   Fever/Chills   x    Fatigue   x    Muscle Aches   x    Eye Irritation   x    Sneezing   x    Nasal Aaron/Drg   x    Sinus Pressure/Pain  x     Loss of smell   x    Dental pain   x    Sore Throat   x    Swollen Glands   x    Ear Pain/Fullness  x  Right ear   Cough   x    Wheeze  x  slightly   Chest Pain   x    Shortness of breath   x    Rash   x    Other   x      Symptom duration:  3 days   Symptom severity:  mild, but exacerbating his vertigo   Treatments tried:  peroxide yesterday afternoon, worsened after (plugged it up more)   Contacts:  every one is sick, resides at [facility]       Problem list and histories reviewed & adjusted, as indicated.  Additional history: as documented    Patient Active Problem List   Diagnosis     Anxiety     Cigarette smoker     Drug abuse (H)     Hyperlipidemia     Major depression, recurrent (H)     Sinusitis     Past Surgical History:   Procedure Laterality Date     COLONOSCOPY  2010    WNL       Social History     Tobacco Use     Smoking status: Current Every Day Smoker     Packs/day: 0.25     Types: Cigarettes     Last attempt to quit: 1/7/2016     Years since quitting: 3.0     Smokeless tobacco: Never Used   Substance Use Topics     Alcohol use: No     Alcohol/week: 0.0 oz     Family History   Problem Relation Age of Onset     Emphysema Father      Other - See Comments Father         Blood clots     Hypertension Mother      Hyperlipidemia Mother      Myocardial Infarction Brother          Current Outpatient Medications   Medication Sig Dispense Refill     atorvastatin (LIPITOR) 20 MG tablet Take 1 tablet (20 mg) by mouth daily 90 tablet 3     buPROPion (WELLBUTRIN XL) 150 MG 24 hr tablet Take 1 tablet (150 mg) by mouth daily 90 tablet 3     fluticasone (FLONASE) 50 MCG/ACT nasal  spray Spray 1 spray into both nostrils daily 3 Bottle 3     loratadine (CLARITIN) 10 MG tablet Take 1 tablet (10 mg) by mouth daily 90 tablet 3     Multiple Vitamin (MULTI-VITAMINS) TABS Take 1 tablet by mouth daily       No Known Allergies    Reviewed and updated as needed this visit by clinical staff  Tobacco  Allergies  Meds  Problems  Med Hx  Surg Hx  Fam Hx  Soc Hx        Reviewed and updated as needed this visit by Provider  Tobacco  Allergies  Meds  Problems  Med Hx  Surg Hx  Fam Hx  Soc Hx          ROS:  As above    OBJECTIVE:     /80 (BP Location: Right arm, Patient Position: Sitting, Cuff Size: Adult Large)   Pulse 100   Temp 96.3  F (35.7  C)   Wt 78 kg (172 lb)   BMI 28.84 kg/m    Body mass index is 28.84 kg/m .  GENERAL: healthy, alert and no distress  HENT: normal cephalic/atraumatic, right ear: impacted with cerumen and left ear: normal: no effusions, no erythema, normal landmarks    Diagnostic Test Results:  none     ASSESSMENT/PLAN:     1. Impacted cerumen of right ear  Flushed out easily with water, normal landmarks with scarring of TM noted. Follow up as needed.     Tawanna Charles NP  [facility]

## 2019-01-10 NOTE — NURSING NOTE
Patient presents to the clinic for right ear pain. Patient states he started taking his Flonase and Claritin about 1 week ago and states the ear ache started about 3 days ago.    Medication Reconciliation Completed.    Elian Linton LPN  1/10/2019 2:17 PM

## 2019-01-11 ASSESSMENT — ANXIETY QUESTIONNAIRES: GAD7 TOTAL SCORE: 0

## 2019-04-09 ENCOUNTER — OFFICE VISIT (OUTPATIENT)
Dept: INTERNAL MEDICINE | Facility: OTHER | Age: 59
End: 2019-04-09
Attending: INTERNAL MEDICINE
Payer: COMMERCIAL

## 2019-04-09 VITALS
SYSTOLIC BLOOD PRESSURE: 122 MMHG | BODY MASS INDEX: 28.84 KG/M2 | RESPIRATION RATE: 16 BRPM | DIASTOLIC BLOOD PRESSURE: 84 MMHG | WEIGHT: 172 LBS | HEART RATE: 72 BPM

## 2019-04-09 DIAGNOSIS — H65.01 RIGHT ACUTE SEROUS OTITIS MEDIA, RECURRENCE NOT SPECIFIED: Primary | ICD-10-CM

## 2019-04-09 PROCEDURE — G0463 HOSPITAL OUTPT CLINIC VISIT: HCPCS

## 2019-04-09 PROCEDURE — 99213 OFFICE O/P EST LOW 20 MIN: CPT | Performed by: INTERNAL MEDICINE

## 2019-04-09 RX ORDER — AMOXICILLIN 875 MG
875 TABLET ORAL 2 TIMES DAILY
Qty: 20 TABLET | Refills: 0 | Status: SHIPPED | OUTPATIENT
Start: 2019-04-09 | End: 2019-04-19

## 2019-04-09 ASSESSMENT — ENCOUNTER SYMPTOMS
CONSTITUTIONAL NEGATIVE: 1
EYES NEGATIVE: 1
ENDOCRINE NEGATIVE: 1
ALLERGIC/IMMUNOLOGIC NEGATIVE: 1

## 2019-04-09 ASSESSMENT — PATIENT HEALTH QUESTIONNAIRE - PHQ9: SUM OF ALL RESPONSES TO PHQ QUESTIONS 1-9: 0

## 2019-04-09 NOTE — NURSING NOTE
"The patient is here today to be seen for his right ear. He states that it feels plugged.  Ana Lehman LPN on 4/9/2019 at 7:41 AM  Chief Complaint   Patient presents with     Ear Problem     right       Initial /84 (BP Location: Right arm, Patient Position: Sitting, Cuff Size: Adult Regular)   Pulse 72   Resp 16   Wt 78 kg (172 lb)   BMI 28.84 kg/m   Estimated body mass index is 28.84 kg/m  as calculated from the following:    Height as of 12/31/18: 1.645 m (5' 4.75\").    Weight as of this encounter: 78 kg (172 lb).  Medication Reconciliation: complete    Ana Lehman LPN    "

## 2019-04-09 NOTE — PROGRESS NOTES
Chief Complaint:  URI and plugged ear.    HPI: He has had a cold for the last couple of weeks.  For the last 2 or 3 days, his right ear is plugged and he cannot hear out of it.  He would like to have that checked.  He has not had a fever.  No other significant issues or problems.  He does have Claritin and Flonase but he only uses it 3 days out of the week.  Of note is that he quit smoking 3 weeks ago and he has been off drugs for some 4 months.  He will complete his treatment at the end of this week and plans to move into an apartment and work at a [employer].    Past Medical History:   Diagnosis Date     Cigarette nicotine dependence, uncomplicated     Quit 2016     Hyperlipidemia     No Comments Provided     Major depressive disorder, single episode     No Comments Provided     Uncomplicated alcohol abuse        Past Surgical History:   Procedure Laterality Date     COLONOSCOPY  08/22/2010    WNL       No Known Allergies    Current Outpatient Medications   Medication Sig Dispense Refill     amoxicillin (AMOXIL) 875 MG tablet Take 1 tablet (875 mg) by mouth 2 times daily for 10 days 20 tablet 0     atorvastatin (LIPITOR) 20 MG tablet Take 1 tablet (20 mg) by mouth daily 90 tablet 3     buPROPion (WELLBUTRIN XL) 150 MG 24 hr tablet Take 1 tablet (150 mg) by mouth daily 90 tablet 3     fluticasone (FLONASE) 50 MCG/ACT nasal spray Spray 1 spray into both nostrils daily 3 Bottle 3     loratadine (CLARITIN) 10 MG tablet Take 1 tablet (10 mg) by mouth daily 90 tablet 3     Multiple Vitamin (MULTI-VITAMINS) TABS Take 1 tablet by mouth daily         Review of Systems   Constitutional: Negative.    Eyes: Negative.    Endocrine: Negative.    Allergic/Immunologic: Negative.        Physical Exam   Constitutional: He appears well-developed and well-nourished. No distress.   HENT:   Head: Normocephalic.   Right Ear: Tympanic membrane is retracted. A middle ear effusion is present.   Left Ear: External ear normal.   Nose: Right  sinus exhibits maxillary sinus tenderness. Right sinus exhibits no frontal sinus tenderness. Left sinus exhibits no maxillary sinus tenderness and no frontal sinus tenderness.   Mouth/Throat: Oropharynx is clear and moist. No oropharyngeal exudate.   Neck: Normal range of motion. Neck supple. No JVD present. No tracheal deviation present. No thyromegaly present.   Lymphadenopathy:     He has no cervical adenopathy.   Skin: He is not diaphoretic.   Nursing note and vitals reviewed.      Assessment:        ICD-10-CM    1. Right acute serous otitis media, recurrence not specified H65.01 amoxicillin (AMOXIL) 875 MG tablet       Plan: I reviewed the diagnosis and treatment with the patient.  He should use his Claritin and Flonase daily.  Amoxicillin twice daily for 10 days.  Advised that this may take a couple of weeks to clear.  Notify if not improving.

## 2020-01-09 ENCOUNTER — OFFICE VISIT (OUTPATIENT)
Dept: FAMILY MEDICINE | Facility: OTHER | Age: 60
End: 2020-01-09
Attending: UROLOGY
Payer: COMMERCIAL

## 2020-01-09 ENCOUNTER — TELEPHONE (OUTPATIENT)
Dept: FAMILY MEDICINE | Facility: OTHER | Age: 60
End: 2020-01-09

## 2020-01-09 VITALS
HEART RATE: 104 BPM | OXYGEN SATURATION: 98 % | WEIGHT: 168 LBS | DIASTOLIC BLOOD PRESSURE: 70 MMHG | TEMPERATURE: 97.5 F | BODY MASS INDEX: 27.99 KG/M2 | SYSTOLIC BLOOD PRESSURE: 122 MMHG | HEIGHT: 65 IN | RESPIRATION RATE: 20 BRPM

## 2020-01-09 DIAGNOSIS — G56.03 BILATERAL CARPAL TUNNEL SYNDROME: Primary | ICD-10-CM

## 2020-01-09 DIAGNOSIS — G56.02 CARPAL TUNNEL SYNDROME OF LEFT WRIST: Primary | ICD-10-CM

## 2020-01-09 LAB
ANION GAP SERPL CALCULATED.3IONS-SCNC: 8 MMOL/L (ref 3–14)
BUN SERPL-MCNC: 27 MG/DL (ref 7–25)
CALCIUM SERPL-MCNC: 9.9 MG/DL (ref 8.6–10.3)
CHLORIDE SERPL-SCNC: 103 MMOL/L (ref 98–107)
CO2 SERPL-SCNC: 27 MMOL/L (ref 21–31)
CREAT SERPL-MCNC: 1.01 MG/DL (ref 0.7–1.3)
GFR SERPL CREATININE-BSD FRML MDRD: 76 ML/MIN/{1.73_M2}
GLUCOSE SERPL-MCNC: 102 MG/DL (ref 70–105)
POTASSIUM SERPL-SCNC: 4.2 MMOL/L (ref 3.5–5.1)
SODIUM SERPL-SCNC: 138 MMOL/L (ref 134–144)
TSH SERPL DL<=0.05 MIU/L-ACNC: 1.3 IU/ML (ref 0.34–5.6)

## 2020-01-09 PROCEDURE — 84443 ASSAY THYROID STIM HORMONE: CPT | Mod: ZL | Performed by: FAMILY MEDICINE

## 2020-01-09 PROCEDURE — 80048 BASIC METABOLIC PNL TOTAL CA: CPT | Mod: ZL | Performed by: FAMILY MEDICINE

## 2020-01-09 PROCEDURE — 99213 OFFICE O/P EST LOW 20 MIN: CPT | Performed by: FAMILY MEDICINE

## 2020-01-09 PROCEDURE — 36415 COLL VENOUS BLD VENIPUNCTURE: CPT | Mod: ZL | Performed by: FAMILY MEDICINE

## 2020-01-09 ASSESSMENT — PAIN SCALES - GENERAL: PAINLEVEL: EXTREME PAIN (8)

## 2020-01-09 ASSESSMENT — MIFFLIN-ST. JEOR: SCORE: 1508.92

## 2020-01-09 ASSESSMENT — PATIENT HEALTH QUESTIONNAIRE - PHQ9: SUM OF ALL RESPONSES TO PHQ QUESTIONS 1-9: 0

## 2020-01-09 NOTE — LETTER
January 10, 2020      Beau Koch  [address] [address]        Dear ,    We are writing to inform you of your test results.    Your test results fall within the expected range(s) or remain unchanged from previous results.  Please continue with current treatment plan.    Resulted Orders   TSH Reflex GH   Result Value Ref Range    TSH Reflex 1.30 0.34 - 5.60 IU/mL   Basic Metabolic Panel   Result Value Ref Range    Sodium 138 134 - 144 mmol/L    Potassium 4.2 3.5 - 5.1 mmol/L    Chloride 103 98 - 107 mmol/L    Carbon Dioxide 27 21 - 31 mmol/L    Anion Gap 8 3 - 14 mmol/L    Glucose 102 70 - 105 mg/dL    Urea Nitrogen 27 (H) 7 - 25 mg/dL    Creatinine 1.01 0.70 - 1.30 mg/dL    GFR Estimate 76 >60 mL/min/[1.73_m2]    GFR Estimate If Black >90 >60 mL/min/[1.73_m2]    Calcium 9.9 8.6 - 10.3 mg/dL       If you have any questions or concerns, please call the clinic at the number listed above.       Sincerely,        Taye Langford MD

## 2020-01-09 NOTE — NURSING NOTE
Patient here for numbness in both hands and right arm it will wake him up at night. This started 2 months ago. Medication Reconciliation: complete.    Luisa Alvarado LPN  1/9/2020 11:12 AM

## 2020-01-09 NOTE — PROGRESS NOTES
"  SUBJECTIVE:   Beau Koch is a 59 year old male who presents to clinic today for the following health issues: Hand numbness    Patient arrives here for hand numbness bilateral.  He states that extends up the arm.  He is dropping things.  Typically in the morning it feels like pins-and-needles.  And feels like shocks going down his hands.  Is been going on for couple months seems to be getting worse.  No trauma.  No fevers chills.  He wakes up with both hands numb.        Patient Active Problem List    Diagnosis Date Noted     Carpal tunnel syndrome of left wrist 01/09/2020     Priority: Medium     Sinusitis 01/07/2015     Priority: Medium     Anxiety 03/04/2014     Priority: Medium     Drug abuse (H) 10/14/2013     Priority: Medium     Hyperlipidemia 10/14/2013     Priority: Medium     Cigarette smoker 02/27/2013     Priority: Medium     Major depression, recurrent (H) 02/27/2013     Priority: Medium     Past Medical History:   Diagnosis Date     Cigarette nicotine dependence, uncomplicated     Quit 2016     Hyperlipidemia     No Comments Provided     Major depressive disorder, single episode     No Comments Provided     Uncomplicated alcohol abuse       Family History   Problem Relation Age of Onset     Emphysema Father      Other - See Comments Father         Blood clots     Hypertension Mother      Hyperlipidemia Mother      Myocardial Infarction Brother      No Known Allergies    Review of Systems     OBJECTIVE:     /70   Pulse 104   Temp 97.5  F (36.4  C)   Resp 20   Ht 1.651 m (5' 5\")   Wt 76.2 kg (168 lb)   SpO2 98%   BMI 27.96 kg/m    Body mass index is 27.96 kg/m .  Physical Exam  Pulmonary:      Effort: Pulmonary effort is normal.   Musculoskeletal:      Comments: Patient has lost a little prominence in his thenar eminence.  Right greater than left.  Decreased strength.  Negative Finkelstein's.   Neurological:      General: No focal deficit present.      Mental Status: He is alert. "         Diagnostic Test Results:  Results for orders placed or performed in visit on 01/09/20 (from the past 24 hour(s))   TSH Reflex GH   Result Value Ref Range    TSH Reflex 1.30 0.34 - 5.60 IU/mL   Basic Metabolic Panel   Result Value Ref Range    Sodium 138 134 - 144 mmol/L    Potassium 4.2 3.5 - 5.1 mmol/L    Chloride 103 98 - 107 mmol/L    Carbon Dioxide 27 21 - 31 mmol/L    Anion Gap 8 3 - 14 mmol/L    Glucose 102 70 - 105 mg/dL    Urea Nitrogen 27 (H) 7 - 25 mg/dL    Creatinine 1.01 0.70 - 1.30 mg/dL    GFR Estimate 76 >60 mL/min/[1.73_m2]    GFR Estimate If Black >90 >60 mL/min/[1.73_m2]    Calcium 9.9 8.6 - 10.3 mg/dL       ASSESSMENT/PLAN:         1. Carpal tunnel syndrome of left wrist  Patient has lost a little proceed with an EMG.  Likely carpal tunnel syndrome.  - EMG; Future  - Basic Metabolic Panel; Future  - TSH Reflex GH; Future  - TSH Reflex GH  - Basic Metabolic Panel      Taye Langford MD  [facility]

## 2020-01-09 NOTE — TELEPHONE ENCOUNTER
Reason for call: Request for a outpatient referral.    Referral requested for what concern?  Carpal tunnel    Have you already been seen by the specialty you need the referral for?  No    If no,  Where do you want to go?   [facility] in [place]    Additional comments:   Needs an outpatient referral placed for EMG    Preferred method for responding to this message: Telephone Call    Phone number patient can be reached at? Home number on file [phone] (home)    If we can't reach you directly, may we leave a detailed response at the number you provided? Yes  Jessica Reyes on 1/9/2020 at 4:23 PM

## 2020-01-15 ENCOUNTER — TELEPHONE (OUTPATIENT)
Dept: FAMILY MEDICINE | Facility: OTHER | Age: 60
End: 2020-01-15

## 2020-01-15 DIAGNOSIS — G56.02 CARPAL TUNNEL SYNDROME OF LEFT WRIST: Primary | ICD-10-CM

## 2020-01-15 NOTE — TELEPHONE ENCOUNTER
Patient is requesting a referral for neurology for bilateral EMG referral there is an order placed but not referral. Luisa Alvarado LPN .......................1/15/2020  1:16 PM

## 2020-01-17 DIAGNOSIS — E78.5 HYPERLIPIDEMIA, UNSPECIFIED HYPERLIPIDEMIA TYPE: ICD-10-CM

## 2020-01-17 RX ORDER — ATORVASTATIN CALCIUM 20 MG/1
20 TABLET, FILM COATED ORAL DAILY
Qty: 90 TABLET | Refills: 3 | Status: SHIPPED | OUTPATIENT
Start: 2020-01-17 | End: 2020-10-20

## 2020-01-17 NOTE — TELEPHONE ENCOUNTER
"Requested Prescriptions   Pending Prescriptions Disp Refills     atorvastatin (LIPITOR) 20 MG tablet [Pharmacy Med Name: ATORVASTATIN CALCIUM 20 MG TABLET] 90 tablet 3     Sig: Take 1 tablet (20 mg) by mouth daily       Statins Protocol Failed - 1/17/2020 10:28 AM        Failed - LDL on file in past 12 months     Recent Labs   Lab Test 12/31/18  0841   *             Passed - No abnormal creatine kinase in past 12 months     No lab results found.             Passed - Recent (12 mo) or future (30 days) visit within the authorizing provider's specialty     Patient has had an office visit with the authorizing provider or a provider within the authorizing providers department within the previous 12 mos or has a future within next 30 days. See \"Patient Info\" tab in inbasket, or \"Choose Columns\" in Meds & Orders section of the refill encounter.              Passed - Medication is active on med list        Passed - Patient is age 18 or older        Last Written Prescription Date:  12/31/2018  Last Fill Quantity: 90,   # refills: 3  Last Office Visit: 04/09/2019  Future Office visit:   None noted  Unable to complete prescription refill per RN medication refill policy. Will route to provider for review and consideration.  Savita Rhodes RN on 1/17/2020 at 2:07 PM      "

## 2020-10-20 ENCOUNTER — HOSPITAL ENCOUNTER (OUTPATIENT)
Dept: GENERAL RADIOLOGY | Facility: OTHER | Age: 60
End: 2020-10-20
Attending: PHYSICIAN ASSISTANT
Payer: COMMERCIAL

## 2020-10-20 ENCOUNTER — OFFICE VISIT (OUTPATIENT)
Dept: FAMILY MEDICINE | Facility: OTHER | Age: 60
End: 2020-10-20
Attending: PHYSICIAN ASSISTANT
Payer: COMMERCIAL

## 2020-10-20 VITALS
SYSTOLIC BLOOD PRESSURE: 124 MMHG | DIASTOLIC BLOOD PRESSURE: 68 MMHG | HEIGHT: 65 IN | OXYGEN SATURATION: 97 % | BODY MASS INDEX: 28.22 KG/M2 | RESPIRATION RATE: 14 BRPM | WEIGHT: 169.4 LBS | TEMPERATURE: 97.5 F | HEART RATE: 88 BPM

## 2020-10-20 DIAGNOSIS — Z01.818 PREOP GENERAL PHYSICAL EXAM: ICD-10-CM

## 2020-10-20 DIAGNOSIS — J32.9 CHRONIC SINUSITIS, UNSPECIFIED LOCATION: ICD-10-CM

## 2020-10-20 DIAGNOSIS — E78.5 HYPERLIPIDEMIA, UNSPECIFIED HYPERLIPIDEMIA TYPE: ICD-10-CM

## 2020-10-20 DIAGNOSIS — S69.92XA HAND INJURY, LEFT, INITIAL ENCOUNTER: ICD-10-CM

## 2020-10-20 DIAGNOSIS — F33.41 RECURRENT MAJOR DEPRESSIVE DISORDER, IN PARTIAL REMISSION (H): ICD-10-CM

## 2020-10-20 DIAGNOSIS — S69.92XA HAND INJURY, LEFT, INITIAL ENCOUNTER: Primary | ICD-10-CM

## 2020-10-20 LAB
CHOLEST SERPL-MCNC: 218 MG/DL
HDLC SERPL-MCNC: 54 MG/DL (ref 23–92)
HGB BLD-MCNC: 13.8 G/DL (ref 13.3–17.7)
LDLC SERPL CALC-MCNC: 139 MG/DL
NONHDLC SERPL-MCNC: 164 MG/DL
TRIGL SERPL-MCNC: 124 MG/DL

## 2020-10-20 PROCEDURE — 99213 OFFICE O/P EST LOW 20 MIN: CPT | Performed by: PHYSICIAN ASSISTANT

## 2020-10-20 PROCEDURE — 85018 HEMOGLOBIN: CPT | Mod: ZL | Performed by: PHYSICIAN ASSISTANT

## 2020-10-20 PROCEDURE — 36415 COLL VENOUS BLD VENIPUNCTURE: CPT | Mod: ZL | Performed by: PHYSICIAN ASSISTANT

## 2020-10-20 PROCEDURE — C9803 HOPD COVID-19 SPEC COLLECT: HCPCS

## 2020-10-20 PROCEDURE — U0003 INFECTIOUS AGENT DETECTION BY NUCLEIC ACID (DNA OR RNA); SEVERE ACUTE RESPIRATORY SYNDROME CORONAVIRUS 2 (SARS-COV-2) (CORONAVIRUS DISEASE [COVID-19]), AMPLIFIED PROBE TECHNIQUE, MAKING USE OF HIGH THROUGHPUT TECHNOLOGIES AS DESCRIBED BY CMS-2020-01-R: HCPCS | Mod: ZL | Performed by: PHYSICIAN ASSISTANT

## 2020-10-20 PROCEDURE — 73130 X-RAY EXAM OF HAND: CPT | Mod: LT

## 2020-10-20 PROCEDURE — 80061 LIPID PANEL: CPT | Mod: ZL | Performed by: PHYSICIAN ASSISTANT

## 2020-10-20 PROCEDURE — G0463 HOSPITAL OUTPT CLINIC VISIT: HCPCS | Mod: 25

## 2020-10-20 PROCEDURE — G0463 HOSPITAL OUTPT CLINIC VISIT: HCPCS

## 2020-10-20 RX ORDER — LORATADINE 10 MG/1
1 TABLET ORAL DAILY
Qty: 90 TABLET | Refills: 3 | Status: SHIPPED | OUTPATIENT
Start: 2020-10-20 | End: 2021-10-12

## 2020-10-20 RX ORDER — BUPROPION HYDROCHLORIDE 150 MG/1
150 TABLET ORAL DAILY
Qty: 90 TABLET | Refills: 3 | Status: SHIPPED | OUTPATIENT
Start: 2020-10-20

## 2020-10-20 RX ORDER — ATORVASTATIN CALCIUM 20 MG/1
20 TABLET, FILM COATED ORAL DAILY
Qty: 90 TABLET | Refills: 3 | Status: SHIPPED | OUTPATIENT
Start: 2020-10-20 | End: 2021-10-12

## 2020-10-20 ASSESSMENT — MIFFLIN-ST. JEOR: SCORE: 1510.27

## 2020-10-20 ASSESSMENT — PAIN SCALES - GENERAL: PAINLEVEL: SEVERE PAIN (7)

## 2020-10-20 NOTE — PROGRESS NOTES
Nursing Notes:   Alexa Hernandez LPN  10/20/2020 10:00 AM  Sign at exiting of workspace  Patient presents to clinic with left hand pain after falling a few nights ago on the ice.  Alexa Hernandez LPN ....................  10/20/2020   10:00 AM        SUBJECTIVE:     HPI  Beau Koch is a 60 year old male who presents to clinic today for evaluation of left hand injury. States  he was walking when he slipped on ice and landed on the medial aspect of his left hand. Tried to break his fall with his hand. 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 Has not been taking anything for symptomatic management. Has developed some tingling in his digits over the past day. Mild overlying bruising.     Also requesting refills of medication. Lipitor for hyperlipidemia, Wellbutrin for depression, and Claritin for chronic sinusitis. Doing well with all. Notes no side effects.     Review of Systems   Per HPI.    PAST MEDICAL HISTORY:   Past Medical History:   Diagnosis Date     Cigarette nicotine dependence, uncomplicated     Quit      Hyperlipidemia     No Comments Provided     Major depressive disorder, single episode     No Comments Provided     Uncomplicated alcohol abuse        PAST SURGICAL HISTORY:   Past Surgical History:   Procedure Laterality Date     COLONOSCOPY  2010    WNL       FAMILY HISTORY:   Family History   Problem Relation Age of Onset     Emphysema Father      Other - See Comments Father         Blood clots     Hypertension Mother      Hyperlipidemia Mother      Myocardial Infarction Brother        SOCIAL HISTORY:   Social History     Tobacco Use     Smoking status: Former Smoker     Packs/day: 0.25     Types: Cigarettes     Quit date: 3/7/2018     Years since quittin.6     Smokeless tobacco: Never Used   Substance Use Topics     Alcohol use: No     Alcohol/week:  "0.0 standard drinks      No Known Allergies  Current Outpatient Medications   Medication     atorvastatin (LIPITOR) 20 MG tablet     buPROPion (WELLBUTRIN XL) 150 MG 24 hr tablet     loratadine (CLARITIN) 10 MG tablet     No current facility-administered medications for this visit.          OBJECTIVE:     /68   Pulse 88   Temp 97.5  F (36.4  C)   Resp 14   Ht 1.651 m (5' 5\")   Wt 76.8 kg (169 lb 6.4 oz)   SpO2 97%   BMI 28.19 kg/m    Body mass index is 28.19 kg/m .  Physical Exam  General: Pleasant, in no apparent distress.  Cardiovascular: Regular rate and rhythm with S1 equal to S2. No murmurs, friction rubs, or gallops.   Respiratory: Lungs are resonant and clear to auscultation bilaterally. No wheezes, crackles, or rhonchi.  Musculoskeletal:    Right hand: No tenderness to palpation. Full ROM of fingers, hand, and wrist.    Left hand: Tenderness to palpation over medial aspect of hand. Limited ROM of all fingers. Significant swelling throughout entire hand and fingers. Full ROM of wrist. Good capillary refill. Strong radial pulse.   Neurologic Exam: Non-focal, symmetric DTRs, normal gross motor, tone coordination and no visible tremor.  Skin: No jaundice, pallor, rashes, or lesions.  Psych: Appropriate mood and affect.    Results for orders placed or performed during the hospital encounter of 10/20/20   XR Hand Left G/E 3 Views     Status: None    Narrative    PROCEDURE:  XR HAND LT G/E 3 VW    HISTORY: Hand injury, left, initial encounter    COMPARISON:  None.    TECHNIQUE:  4 views of the left wrist were obtained.    FINDINGS:  Is a fracture of the proximal shaft of the fifth  metacarpal. Major distal fracture fragment is displaced toward the  ulnar aspect of the wrist by 5 mm. Major distal fracture fragment is  displaced dorsally by 5 mm. The remainder of the hand appears intact.  Articular spaces are normal in height.       Impression    IMPRESSION: Fifth Metacarpal fracture proximally  "     CYNTHIA MELISSA MD         ASSESSMENT/PLAN:     (S69.92XA) Hand injury, left, initial encounter  (primary encounter diagnosis)  Comment: Fracture of 5th metacarpal. Patient placed in ulnar gutter spint. Orthopedics reviewed and states will require surgical treatment. Scheduled for 10/23/2020. Including preoperative exam in this visit. Cardiac and pulmonary exam unremarkable as outlined above. Hemoglobin and COVID test pending.   Encouraged symptomatic management with Tylenol, icing, elevating.   Plan: XR Hand Left G/E 3 Views            (F33.41) Recurrent major depressive disorder, in partial remission (H)  Comment: Refilled medication as outlined below. Educated on medication, use, and side effects. Follow up as needed.   Plan: buPROPion (WELLBUTRIN XL) 150 MG 24 hr tablet            (E78.5) Hyperlipidemia, unspecified hyperlipidemia type  Comment: Refilled medication as outlined below. Educated on medication, use, and side effects. Follow up as needed. Due for lipid panel, completed today. Results pending, will notify patient with results and address treatment if indicated.   Plan: atorvastatin (LIPITOR) 20 MG tablet, Lipid         Panel            (J32.9) Chronic sinusitis, unspecified location  Comment: Refilled medication as outlined below. Educated on medication, use, and side effects. Follow up as needed.   Plan: loratadine (CLARITIN) 10 MG tablet              Julissa Stern PA-C  [facility]

## 2020-10-20 NOTE — LETTER
[facility]  [address]  [place] [address]  [phone]  Dept: [phone]      October 20, 2020      Patient: Beau Koch   YOB: 1960   Date of Visit: 10/20/2020       To Whom It May Concern:    Beau Koch was seen and treated in our clinic today. Please excuse his absence from work while he is recovering.            Sincerely,         Julissa Stern PA-C

## 2020-10-20 NOTE — NURSING NOTE
Patient presents to clinic with left hand pain after falling a few nights ago on the ice.  Alexa Hernandez LPN ....................  10/20/2020   10:00 AM

## 2020-10-20 NOTE — H&P (VIEW-ONLY)
Nursing Notes:   Alexa Hernandez LPN  10/20/2020 10:00 AM  Sign at exiting of workspace  Patient presents to clinic with left hand pain after falling a few nights ago on the ice.  Alexa Hernandez LPN ....................  10/20/2020   10:00 AM        SUBJECTIVE:     HPI  Beau Koch is a 60 year old male who presents to clinic today for evaluation of left hand injury. States  he was walking when he slipped on ice and landed on the medial aspect of his left hand. Tried to break his fall with his hand. 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 Has not been taking anything for symptomatic management. Has developed some tingling in his digits over the past day. Mild overlying bruising.     Also requesting refills of medication. Lipitor for hyperlipidemia, Wellbutrin for depression, and Claritin for chronic sinusitis. Doing well with all. Notes no side effects.     Review of Systems   Per HPI.    PAST MEDICAL HISTORY:   Past Medical History:   Diagnosis Date     Cigarette nicotine dependence, uncomplicated     Quit      Hyperlipidemia     No Comments Provided     Major depressive disorder, single episode     No Comments Provided     Uncomplicated alcohol abuse        PAST SURGICAL HISTORY:   Past Surgical History:   Procedure Laterality Date     COLONOSCOPY  2010    WNL       FAMILY HISTORY:   Family History   Problem Relation Age of Onset     Emphysema Father      Other - See Comments Father         Blood clots     Hypertension Mother      Hyperlipidemia Mother      Myocardial Infarction Brother        SOCIAL HISTORY:   Social History     Tobacco Use     Smoking status: Former Smoker     Packs/day: 0.25     Types: Cigarettes     Quit date: 3/7/2018     Years since quittin.6     Smokeless tobacco: Never Used   Substance Use Topics     Alcohol use: No     Alcohol/week:  "0.0 standard drinks      No Known Allergies  Current Outpatient Medications   Medication     atorvastatin (LIPITOR) 20 MG tablet     buPROPion (WELLBUTRIN XL) 150 MG 24 hr tablet     loratadine (CLARITIN) 10 MG tablet     No current facility-administered medications for this visit.          OBJECTIVE:     /68   Pulse 88   Temp 97.5  F (36.4  C)   Resp 14   Ht 1.651 m (5' 5\")   Wt 76.8 kg (169 lb 6.4 oz)   SpO2 97%   BMI 28.19 kg/m    Body mass index is 28.19 kg/m .  Physical Exam  General: Pleasant, in no apparent distress.  Cardiovascular: Regular rate and rhythm with S1 equal to S2. No murmurs, friction rubs, or gallops.   Respiratory: Lungs are resonant and clear to auscultation bilaterally. No wheezes, crackles, or rhonchi.  Musculoskeletal:    Right hand: No tenderness to palpation. Full ROM of fingers, hand, and wrist.    Left hand: Tenderness to palpation over medial aspect of hand. Limited ROM of all fingers. Significant swelling throughout entire hand and fingers. Full ROM of wrist. Good capillary refill. Strong radial pulse.   Neurologic Exam: Non-focal, symmetric DTRs, normal gross motor, tone coordination and no visible tremor.  Skin: No jaundice, pallor, rashes, or lesions.  Psych: Appropriate mood and affect.    Results for orders placed or performed during the hospital encounter of 10/20/20   XR Hand Left G/E 3 Views     Status: None    Narrative    PROCEDURE:  XR HAND LT G/E 3 VW    HISTORY: Hand injury, left, initial encounter    COMPARISON:  None.    TECHNIQUE:  4 views of the left wrist were obtained.    FINDINGS:  Is a fracture of the proximal shaft of the fifth  metacarpal. Major distal fracture fragment is displaced toward the  ulnar aspect of the wrist by 5 mm. Major distal fracture fragment is  displaced dorsally by 5 mm. The remainder of the hand appears intact.  Articular spaces are normal in height.       Impression    IMPRESSION: Fifth Metacarpal fracture proximally  "     CYNTHIA MELISSA MD         ASSESSMENT/PLAN:     (S69.92XA) Hand injury, left, initial encounter  (primary encounter diagnosis)  Comment: Fracture of 5th metacarpal. Patient placed in ulnar gutter spint. Orthopedics reviewed and states will require surgical treatment. Scheduled for 10/23/2020. Including preoperative exam in this visit. Cardiac and pulmonary exam unremarkable as outlined above. Hemoglobin and COVID test pending.   Encouraged symptomatic management with Tylenol, icing, elevating.   Plan: XR Hand Left G/E 3 Views            (F33.41) Recurrent major depressive disorder, in partial remission (H)  Comment: Refilled medication as outlined below. Educated on medication, use, and side effects. Follow up as needed.   Plan: buPROPion (WELLBUTRIN XL) 150 MG 24 hr tablet            (E78.5) Hyperlipidemia, unspecified hyperlipidemia type  Comment: Refilled medication as outlined below. Educated on medication, use, and side effects. Follow up as needed. Due for lipid panel, completed today. Results pending, will notify patient with results and address treatment if indicated.   Plan: atorvastatin (LIPITOR) 20 MG tablet, Lipid         Panel            (J32.9) Chronic sinusitis, unspecified location  Comment: Refilled medication as outlined below. Educated on medication, use, and side effects. Follow up as needed.   Plan: loratadine (CLARITIN) 10 MG tablet              Julissa Stern PA-C  [facility] AND [facility]

## 2020-10-21 LAB
SARS-COV-2 RNA SPEC QL NAA+PROBE: NOT DETECTED
SPECIMEN SOURCE: NORMAL

## 2020-10-22 ENCOUNTER — ANESTHESIA EVENT (OUTPATIENT)
Dept: SURGERY | Facility: OTHER | Age: 60
End: 2020-10-22
Payer: COMMERCIAL

## 2020-10-23 ENCOUNTER — HOSPITAL ENCOUNTER (OUTPATIENT)
Facility: OTHER | Age: 60
Discharge: HOME | End: 2020-10-23
Attending: SPECIALIST | Admitting: SPECIALIST
Payer: COMMERCIAL

## 2020-10-23 ENCOUNTER — ANESTHESIA (OUTPATIENT)
Dept: SURGERY | Facility: OTHER | Age: 60
End: 2020-10-23
Payer: COMMERCIAL

## 2020-10-23 ENCOUNTER — HOSPITAL ENCOUNTER (OUTPATIENT)
Dept: GENERAL RADIOLOGY | Facility: OTHER | Age: 60
End: 2020-10-23
Attending: SPECIALIST | Admitting: SPECIALIST
Payer: COMMERCIAL

## 2020-10-23 VITALS
HEART RATE: 75 BPM | DIASTOLIC BLOOD PRESSURE: 91 MMHG | SYSTOLIC BLOOD PRESSURE: 122 MMHG | RESPIRATION RATE: 18 BRPM | TEMPERATURE: 97.5 F | OXYGEN SATURATION: 95 %

## 2020-10-23 DIAGNOSIS — S62.318A: ICD-10-CM

## 2020-10-23 PROCEDURE — 272N000001 HC OR GENERAL SUPPLY STERILE: Performed by: SPECIALIST

## 2020-10-23 PROCEDURE — 258N000003 HC RX IP 258 OP 636: Performed by: NURSE ANESTHETIST, CERTIFIED REGISTERED

## 2020-10-23 PROCEDURE — 26608 TREAT METACARPAL FRACTURE: CPT | Performed by: SPECIALIST

## 2020-10-23 PROCEDURE — 278N000051 HC OR IMPLANT GENERAL: Performed by: SPECIALIST

## 2020-10-23 PROCEDURE — 250N000011 HC RX IP 250 OP 636: Performed by: NURSE ANESTHETIST, CERTIFIED REGISTERED

## 2020-10-23 PROCEDURE — 360N000014 HC SURGERY LEVEL 2 1ST 30 MIN: Performed by: SPECIALIST

## 2020-10-23 PROCEDURE — 250N000009 HC RX 250: Performed by: SPECIALIST

## 2020-10-23 PROCEDURE — 250N000011 HC RX IP 250 OP 636: Performed by: SPECIALIST

## 2020-10-23 PROCEDURE — 999N000180 XR SURGERY CARM FLUORO LESS THAN 5 MIN

## 2020-10-23 PROCEDURE — 761N000007 HC RECOVERY PHASE 2 EACH 15 MINS: Performed by: SPECIALIST

## 2020-10-23 PROCEDURE — 370N000001 HC ANESTHESIA TECHNICAL FEE, 1ST 30 MIN: Performed by: SPECIALIST

## 2020-10-23 PROCEDURE — 250N000009 HC RX 250: Performed by: NURSE ANESTHETIST, CERTIFIED REGISTERED

## 2020-10-23 PROCEDURE — 999N000136 HC STATISTIC PRE PROC ASSESS II: Performed by: SPECIALIST

## 2020-10-23 PROCEDURE — 360N000015 HC SURGERY LEVEL 2 EA 15 ADDTL MIN: Performed by: SPECIALIST

## 2020-10-23 PROCEDURE — 26608 TREAT METACARPAL FRACTURE: CPT | Performed by: NURSE ANESTHETIST, CERTIFIED REGISTERED

## 2020-10-23 PROCEDURE — 370N000002 HC ANESTHESIA TECHNICAL FEE, EACH ADDTL 15 MIN: Performed by: SPECIALIST

## 2020-10-23 DEVICE — IMP WIRE KIRSCHNER 0.062X4" 1.6MM DBL TROCAR KM 172-24-62: Type: IMPLANTABLE DEVICE | Site: FINGER | Status: FUNCTIONAL

## 2020-10-23 RX ORDER — ONDANSETRON 4 MG/1
4 TABLET, ORALLY DISINTEGRATING ORAL EVERY 30 MIN PRN
Status: DISCONTINUED | OUTPATIENT
Start: 2020-10-23 | End: 2020-10-23 | Stop reason: HOSPADM

## 2020-10-23 RX ORDER — FENTANYL CITRATE 50 UG/ML
INJECTION, SOLUTION INTRAMUSCULAR; INTRAVENOUS PRN
Status: DISCONTINUED | OUTPATIENT
Start: 2020-10-23 | End: 2020-10-23

## 2020-10-23 RX ORDER — FENTANYL CITRATE 50 UG/ML
25-50 INJECTION, SOLUTION INTRAMUSCULAR; INTRAVENOUS
Status: DISCONTINUED | OUTPATIENT
Start: 2020-10-23 | End: 2020-10-23 | Stop reason: HOSPADM

## 2020-10-23 RX ORDER — SODIUM CHLORIDE, SODIUM LACTATE, POTASSIUM CHLORIDE, CALCIUM CHLORIDE 600; 310; 30; 20 MG/100ML; MG/100ML; MG/100ML; MG/100ML
INJECTION, SOLUTION INTRAVENOUS CONTINUOUS
Status: DISCONTINUED | OUTPATIENT
Start: 2020-10-23 | End: 2020-10-23 | Stop reason: HOSPADM

## 2020-10-23 RX ORDER — CEFAZOLIN SODIUM 2 G/100ML
2 INJECTION, SOLUTION INTRAVENOUS
Status: COMPLETED | OUTPATIENT
Start: 2020-10-23 | End: 2020-10-23

## 2020-10-23 RX ORDER — NALOXONE HYDROCHLORIDE 0.4 MG/ML
.1-.4 INJECTION, SOLUTION INTRAMUSCULAR; INTRAVENOUS; SUBCUTANEOUS
Status: DISCONTINUED | OUTPATIENT
Start: 2020-10-23 | End: 2020-10-23 | Stop reason: HOSPADM

## 2020-10-23 RX ORDER — IBUPROFEN 200 MG
400 TABLET ORAL EVERY 4 HOURS PRN
COMMUNITY

## 2020-10-23 RX ORDER — KETOROLAC TROMETHAMINE 30 MG/ML
INJECTION, SOLUTION INTRAMUSCULAR; INTRAVENOUS PRN
Status: DISCONTINUED | OUTPATIENT
Start: 2020-10-23 | End: 2020-10-23

## 2020-10-23 RX ORDER — ONDANSETRON 2 MG/ML
4 INJECTION INTRAMUSCULAR; INTRAVENOUS EVERY 30 MIN PRN
Status: DISCONTINUED | OUTPATIENT
Start: 2020-10-23 | End: 2020-10-23 | Stop reason: HOSPADM

## 2020-10-23 RX ORDER — PROPOFOL 10 MG/ML
INJECTION, EMULSION INTRAVENOUS CONTINUOUS PRN
Status: DISCONTINUED | OUTPATIENT
Start: 2020-10-23 | End: 2020-10-23

## 2020-10-23 RX ORDER — KETAMINE HYDROCHLORIDE 10 MG/ML
INJECTION INTRAMUSCULAR; INTRAVENOUS PRN
Status: DISCONTINUED | OUTPATIENT
Start: 2020-10-23 | End: 2020-10-23

## 2020-10-23 RX ORDER — PROPOFOL 10 MG/ML
INJECTION, EMULSION INTRAVENOUS PRN
Status: DISCONTINUED | OUTPATIENT
Start: 2020-10-23 | End: 2020-10-23

## 2020-10-23 RX ORDER — GINSENG 100 MG
CAPSULE ORAL PRN
Status: DISCONTINUED | OUTPATIENT
Start: 2020-10-23 | End: 2020-10-23 | Stop reason: HOSPADM

## 2020-10-23 RX ORDER — LIDOCAINE 40 MG/G
CREAM TOPICAL
Status: DISCONTINUED | OUTPATIENT
Start: 2020-10-23 | End: 2020-10-23 | Stop reason: HOSPADM

## 2020-10-23 RX ORDER — MEPERIDINE HYDROCHLORIDE 50 MG/ML
12.5 INJECTION INTRAMUSCULAR; INTRAVENOUS; SUBCUTANEOUS
Status: DISCONTINUED | OUTPATIENT
Start: 2020-10-23 | End: 2020-10-23 | Stop reason: HOSPADM

## 2020-10-23 RX ADMIN — PROPOFOL 100 MG: 10 INJECTION, EMULSION INTRAVENOUS at 13:58

## 2020-10-23 RX ADMIN — CEFAZOLIN SODIUM 2 G: 2 INJECTION, SOLUTION INTRAVENOUS at 13:55

## 2020-10-23 RX ADMIN — SODIUM CHLORIDE, POTASSIUM CHLORIDE, SODIUM LACTATE AND CALCIUM CHLORIDE: 600; 310; 30; 20 INJECTION, SOLUTION INTRAVENOUS at 13:22

## 2020-10-23 RX ADMIN — MIDAZOLAM 2 MG: 1 INJECTION INTRAMUSCULAR; INTRAVENOUS at 13:56

## 2020-10-23 RX ADMIN — Medication 30 MG: at 13:56

## 2020-10-23 RX ADMIN — KETOROLAC TROMETHAMINE 30 MG: 30 INJECTION, SOLUTION INTRAMUSCULAR at 14:22

## 2020-10-23 RX ADMIN — FENTANYL CITRATE 50 MCG: 50 INJECTION, SOLUTION INTRAMUSCULAR; INTRAVENOUS at 13:56

## 2020-10-23 RX ADMIN — PROPOFOL 125 MCG/KG/MIN: 10 INJECTION, EMULSION INTRAVENOUS at 13:58

## 2020-10-23 ASSESSMENT — LIFESTYLE VARIABLES: TOBACCO_USE: 1

## 2020-10-23 NOTE — DISCHARGE INSTRUCTIONS
[facility]  Adult Discharge Orders & Instructions    ________________________________________________________________          For 12 hours after surgery  1. Get plenty of rest.  A responsible adult must stay with you for at least 12 hours after you leave the hospital.   2. You may feel lightheaded.  IF so, sit for a few minutes before standing.  Have someone help you get up.   3. You may have a slight fever. Call the doctor if your fever is over 101 F (38.3 C) (taken under the tongue) or lasts longer than 24 hours.  4. You may have a dry mouth, a sore throat, muscle aches or trouble sleeping.  These should go away after 24 hours.  5. Do not make important or legal decisions.  6.   Do not drive or use heavy equipment.  If you have weakness or tingling, don't drive or use heavy equipment until this feeling goes away.    Surgeon Contact Information  If you have questions or concerns related to your procedure please contact your surgeon through [facility] at [phone].

## 2020-10-23 NOTE — OP NOTE
Procedure Date: 10/23/2020      PREOPERATIVE DIAGNOSIS:  Left small finger metacarpal base fracture -- displaced.      POSTOPERATIVE DIAGNOSIS:  Left small finger metacarpal base fracture -- displaced.      PROCEDURE PERFORMED:  Closed reduction and pinning, left small finger metacarpal base fracture.      SURGEON:  Giana Henderson MD      ASSISTANT:  Christiano Tolbert MD      ANESTHESIA:  MAC.      INDICATIONS:  A 60-year-old male who fell on the ice sustaining a displaced fracture of the small finger metacarpal just proximal to the ECU insertion.  Closed reduction and pinning was recommended.  Risks, complications and benefits were reviewed and he elected to proceed.      DESCRIPTION OF PROCEDURE:  The patient was brought to the operating room and placed on the operating table.  He was sedated and a pneumatic tourniquet was placed about the left upper extremity.  The left upper extremity was prepped and draped in the usual sterile manner.  A timeout procedure was performed confirming surgical site, patient name and procedure.      Fluoroscopy was brought in.  Traction was applied to the small finger, reducing the subluxation of the ECU deformity.  This was then pinned with multiple 0.0625 K-wires from the base of the small finger metacarpal to the base of the ring finger metacarpal.  X-ray views, AP, lateral and oblique views showed anatomic reduction.  The K-wires were then bent and cut over the skin surface.  A local block was performed and the patient was splinted in the intrinsic plus position and was brought to the recovery room in stable condition.         GIANA HENDERSON MD             D: 10/23/2020   T: 10/23/2020   MT: ALBERTO      Name:     ARNULFO MENDOZA   MRN:      [id number]        Account:        [id number]   :      1960           Procedure Date: 10/23/2020      Document: [id number]

## 2020-10-23 NOTE — ANESTHESIA PREPROCEDURE EVALUATION
Anesthesia Pre-Procedure Evaluation    Patient: Beau Koch   MRN: [id number] : 1960          Preoperative Diagnosis: Metatarsal fracture [S92.309A]    Procedure(s):  5th Metacarpal base   PINNING    Past Medical History:   Diagnosis Date     Cigarette nicotine dependence, uncomplicated     Quit      Hyperlipidemia     No Comments Provided     Major depressive disorder, single episode     No Comments Provided     Uncomplicated alcohol abuse      Past Surgical History:   Procedure Laterality Date     COLONOSCOPY  2010    WNL       Anesthesia Evaluation     . Pt has had prior anesthetic.     No history of anesthetic complications          ROS/MED HX    ENT/Pulmonary:     (+)tobacco use, Current use 1/4 packs/day  , . .    Neurologic:  - neg neurologic ROS     Cardiovascular:     (+) Dyslipidemia, ----. : . . . :. .       METS/Exercise Tolerance:  >4 METS   Hematologic:  - neg hematologic  ROS       Musculoskeletal:  - neg musculoskeletal ROS       GI/Hepatic:  - neg GI/hepatic ROS       Renal/Genitourinary:  - ROS Renal section negative       Endo:  - neg endo ROS       Psychiatric:     (+) psychiatric history depression and anxiety      Infectious Disease:  - neg infectious disease ROS       Malignancy:      - no malignancy   Other:    - neg other ROS                      Physical Exam  Normal systems: cardiovascular, pulmonary and dental    Airway   Mallampati: II  TM distance: >3 FB  Neck ROM: full    Dental   (+) missing and chipped    Cardiovascular   Rhythm and rate: regular and normal      Pulmonary    breath sounds clear to auscultation            Lab Results   Component Value Date    WBC 7.8 2018    HGB 13.8 10/20/2020    HCT 43.2 2018     2018     2020    POTASSIUM 4.2 2020    CHLORIDE 103 2020    CO2 27 2020    BUN 27 (H) 2020    CR 1.01 2020     2020    RENEE 9.9 2020    ALBUMIN 4.6 2018     "PROTTOTAL 7.3 12/31/2018    ALT 32 12/31/2018    AST 21 12/31/2018    ALKPHOS 59 12/31/2018    BILITOTAL 0.5 12/31/2018       Preop Vitals  BP Readings from Last 3 Encounters:   10/20/20 124/68   01/09/20 122/70   04/09/19 122/84    Pulse Readings from Last 3 Encounters:   10/20/20 88   01/09/20 104   04/09/19 72      Resp Readings from Last 3 Encounters:   10/20/20 14   01/09/20 20   04/09/19 16    SpO2 Readings from Last 3 Encounters:   10/20/20 97%   01/09/20 98%      Temp Readings from Last 1 Encounters:   10/20/20 97.5  F (36.4  C)    Ht Readings from Last 1 Encounters:   10/20/20 1.651 m (5' 5\")      Wt Readings from Last 1 Encounters:   10/20/20 76.8 kg (169 lb 6.4 oz)    Estimated body mass index is 28.19 kg/m  as calculated from the following:    Height as of 10/20/20: 1.651 m (5' 5\").    Weight as of 10/20/20: 76.8 kg (169 lb 6.4 oz).       Anesthesia Plan      History & Physical Review      ASA Status:  2 .    NPO Status:  > 6 hours    Plan for MAC with Propofol induction.   PONV prophylaxis:  Ondansetron (or other 5HT-3)         Postoperative Care  Postoperative pain management:  Multi-modal analgesia.      Consents  Anesthetic plan, risks, benefits and alternatives discussed with:  Patient..                 HIGINIO BARTLETT CRNA  "

## 2020-10-23 NOTE — CONSULTS
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      Plain film radiographs were obtained and he was referred for evaluation.      PAST MEDICAL HISTORY:  Significant for hypercholesterolemia.      PAST SURGICAL HISTORY:  Significant for groin, he is not sure what time.      MEDICATIONS:  Include Wellbutrin and atorvastatin.      ALLERGIES:  NONE.      PHYSICAL EXAMINATION:  A 60-year-old male.  He is alert and oriented x3, and appropriate.  Gait and station are appropriate.  He is well groomed and well kempt.  Examination of the hand shows it to be splinted.  There is intact skin with a normal neurovascular examination.      IMAGING:  AP, lateral and oblique views of the left hand and wrist show a small finger metacarpal base fracture.  This is proximal to the ECU insertion and there is subluxation.      IMPRESSION:  Reverse Cooper fracture.      PLAN:  We discussed proceeding with closed reduction and pinning.  Risks, complications and benefits reviewed.  We will proceed with this today.         GIANA HENDERSON MD             D: 10/23/2020   T: 10/23/2020   MT: YADIRA      Name:     ARNULFO MENDOZA   MRN:      [id number]        Account:       [id number]   :      1960           Consult Date:  10/23/2020      Document: [id number]

## 2020-10-23 NOTE — ANESTHESIA POSTPROCEDURE EVALUATION
Patient: Beau Koch    Procedure(s):  5th Metacarpal base   PINNING    Diagnosis:Metatarsal fracture [S92.309A]  Diagnosis Additional Information: No value filed.    Anesthesia Type:  MAC    Note:  Anesthesia Post Evaluation    Patient location during evaluation: Phase 2  Patient participation: Able to fully participate in evaluation  Level of consciousness: awake and alert  Pain management: adequate  Airway patency: patent  Cardiovascular status: acceptable  Respiratory status: acceptable  Hydration status: acceptable  PONV: none     Anesthetic complications: None          Last vitals:  Vitals:    10/23/20 1440 10/23/20 1445 10/23/20 1500   BP: 110/82 116/84 (!) 122/91   Pulse: 80 78 75   Resp:      Temp: 97.5  F (36.4  C)     SpO2: 96% 95% 95%         Electronically Signed By: HIGINIO Hanley CRNA  October 23, 2020  3:20 PM

## 2020-11-05 DIAGNOSIS — S62.317D CLOSED DISPLACED FRACTURE OF BASE OF FIFTH METACARPAL BONE OF LEFT HAND WITH ROUTINE HEALING, SUBSEQUENT ENCOUNTER: Primary | ICD-10-CM

## 2020-11-06 ENCOUNTER — HOSPITAL ENCOUNTER (OUTPATIENT)
Dept: GENERAL RADIOLOGY | Facility: OTHER | Age: 60
End: 2020-11-06
Attending: SPECIALIST
Payer: COMMERCIAL

## 2020-11-06 ENCOUNTER — OFFICE VISIT (OUTPATIENT)
Dept: ORTHOPEDICS | Facility: OTHER | Age: 60
End: 2020-11-06
Attending: SPECIALIST
Payer: COMMERCIAL

## 2020-11-06 DIAGNOSIS — S62.317D CLOSED DISPLACED FRACTURE OF BASE OF FIFTH METACARPAL BONE OF LEFT HAND WITH ROUTINE HEALING, SUBSEQUENT ENCOUNTER: Primary | ICD-10-CM

## 2020-11-06 DIAGNOSIS — S62.317D CLOSED DISPLACED FRACTURE OF BASE OF FIFTH METACARPAL BONE OF LEFT HAND WITH ROUTINE HEALING, SUBSEQUENT ENCOUNTER: ICD-10-CM

## 2020-11-06 PROCEDURE — 99024 POSTOP FOLLOW-UP VISIT: CPT | Performed by: SPECIALIST

## 2020-11-06 PROCEDURE — 73130 X-RAY EXAM OF HAND: CPT | Mod: LT

## 2020-11-06 PROCEDURE — G0463 HOSPITAL OUTPT CLINIC VISIT: HCPCS | Mod: 25

## 2020-11-06 NOTE — PROGRESS NOTES
Patient is here for follow up on his left small finger pinning.   Vikki Arambula LPN .....................11/6/2020 10:11 AM

## 2020-11-06 NOTE — PROGRESS NOTES
Visit Date:   2020      ORTHOPEDIC FOLLOWUP      Arnulfo returns for followup status post pinning of a right small finger metacarpal base fracture, which was displaced.  The surgery was weeks ago and he is back today doing well.      PHYSICAL EXAMINATION:  Exam today confirms the pin sites to be healing nicely.  There is no evidence of infection.  The digits show no evidence of rotational deformity.  He has some mild digital stiffness.        IMAGING:  X-rays were reviewed, AP, lateral and oblique views of the left hand.  They show the metacarpal base fracture to be pinned anatomically in good position on 2 views.      IMPRESSION:  Two weeks status post pinning, left small finger metacarpal base fracture.  We will see him back in 2 weeks' time with repeat x-rays.  Plan on pulling the pins at that time.         GIANA HENDERSON MD             D: 2020   T: 2020   MT: YADIRA      Name:     ARNULFO MENDOZA   MRN:      [id number]        Account:      [id number]   :      1960           Visit Date:   2020      Document: [id number]

## 2020-11-17 DIAGNOSIS — S62.317D CLOSED DISPLACED FRACTURE OF BASE OF FIFTH METACARPAL BONE OF LEFT HAND WITH ROUTINE HEALING, SUBSEQUENT ENCOUNTER: Primary | ICD-10-CM

## 2020-11-20 ENCOUNTER — HOSPITAL ENCOUNTER (OUTPATIENT)
Dept: GENERAL RADIOLOGY | Facility: OTHER | Age: 60
End: 2020-11-20
Attending: SPECIALIST
Payer: COMMERCIAL

## 2020-11-20 ENCOUNTER — OFFICE VISIT (OUTPATIENT)
Dept: ORTHOPEDICS | Facility: OTHER | Age: 60
End: 2020-11-20
Attending: SPECIALIST
Payer: COMMERCIAL

## 2020-11-20 DIAGNOSIS — S62.317D CLOSED DISPLACED FRACTURE OF BASE OF FIFTH METACARPAL BONE OF LEFT HAND WITH ROUTINE HEALING, SUBSEQUENT ENCOUNTER: ICD-10-CM

## 2020-11-20 DIAGNOSIS — S62.317D CLOSED DISPLACED FRACTURE OF BASE OF FIFTH METACARPAL BONE OF LEFT HAND WITH ROUTINE HEALING, SUBSEQUENT ENCOUNTER: Primary | ICD-10-CM

## 2020-11-20 PROCEDURE — 73130 X-RAY EXAM OF HAND: CPT | Mod: LT

## 2020-11-20 PROCEDURE — 99024 POSTOP FOLLOW-UP VISIT: CPT | Performed by: SPECIALIST

## 2020-11-20 PROCEDURE — G0463 HOSPITAL OUTPT CLINIC VISIT: HCPCS

## 2020-11-20 NOTE — PROGRESS NOTES
Patient is here for follow up on his right hand.   Vikki Arambula LPN .....................11/20/2020 8:06 AM

## 2020-11-20 NOTE — PROGRESS NOTES
Visit Date:   2020      HISTORY OF PRESENT ILLNESS:  Beau returns for followup status post pinning of a right small finger metacarpal base fracture.  He is 4 weeks out at this point, he is doing well.  He has been off work since that time.      Physical examination today reveals pin sites to be healing well.  There is no evidence of infection.  He has limited flexion at the metacarpals.  He is able to get to full extension.      X-rays were taken today, which show the fracture to be healing well.  No evidence of shortening.      IMPRESSION:  He is 4 weeks out, status post small finger metacarpal base pinning.  We are going to allow him to return to work with light duty.  We are going to see him on an as-needed basis.  We gave him a removable splint to wear to protect the fracture site.  We told him to take it easy on his hand for another 3 weeks at least.  He is going to give us a call if he has any problems.         GIANA HENDERSON MD       As dictated by HARSHAD DYSON PA-C            D: 2020   T: 2020   MT: DINO      Name:     BEAU MENDOZA   MRN:      [id number]        Account:      [id number]   :      1960           Visit Date:   2020      Document: [id number]

## 2020-12-07 ENCOUNTER — OFFICE VISIT (OUTPATIENT)
Dept: ORTHOPEDICS | Facility: OTHER | Age: 60
End: 2020-12-07
Attending: FAMILY MEDICINE
Payer: COMMERCIAL

## 2020-12-07 ENCOUNTER — HOSPITAL ENCOUNTER (OUTPATIENT)
Dept: GENERAL RADIOLOGY | Facility: OTHER | Age: 60
End: 2020-12-07
Attending: ORTHOPAEDIC SURGERY
Payer: COMMERCIAL

## 2020-12-07 DIAGNOSIS — M79.642 PAIN OF LEFT HAND: Primary | ICD-10-CM

## 2020-12-07 DIAGNOSIS — S62.317D CLOSED DISPLACED FRACTURE OF BASE OF FIFTH METACARPAL BONE OF LEFT HAND WITH ROUTINE HEALING, SUBSEQUENT ENCOUNTER: ICD-10-CM

## 2020-12-07 DIAGNOSIS — S62.317D CLOSED DISPLACED FRACTURE OF BASE OF FIFTH METACARPAL BONE OF LEFT HAND WITH ROUTINE HEALING, SUBSEQUENT ENCOUNTER: Primary | ICD-10-CM

## 2020-12-07 PROCEDURE — 99024 POSTOP FOLLOW-UP VISIT: CPT | Performed by: PHYSICIAN ASSISTANT

## 2020-12-07 PROCEDURE — 73130 X-RAY EXAM OF HAND: CPT | Mod: LT

## 2020-12-07 PROCEDURE — G0463 HOSPITAL OUTPT CLINIC VISIT: HCPCS

## 2020-12-07 NOTE — PROGRESS NOTES
Visit Date:   2020      HISTORY OF PRESENT ILLNESS:  Beau returns for followup status post pinning of a left small finger metacarpal base fracture.  He is about 6-1/2 weeks out at this point.  He was back to work on light duty.  We gave him a removable splint at his last followup visit and told him to protect it for about 3 weeks or so.  He was doing well up until about 4 or 5 days ago when he started getting pain that he describes into the index, middle and ring finger.  He says that this pain waxes and wanes.  He does endorse a little bit of numbness with these 3 fingers as well.  He says that he has had a prior evaluation for carpal tunnel syndrome.  He has never really had anything done about it.  He does endorse a little bit of swelling.  He does not remember any preceding traumatic event to the hand that initially caused his pain.      PHYSICAL EXAMINATION:  Today reveals healing pin sites.  There is a little bit of erythema around it, but no evidence of infection.  He has some limited digital range of motion today.  He is able to get to full extension.  The fracture site is nontender to palpation.      ASSESSMENT:  Probable carpal tunnel syndrome.      IMAGING:  X-rays were taken in the office today which reveal healing fracture of the fifth metacarpal.  There is no change in position since the previous x-rays were taken.      PLAN:  Our plan today is to have him wear the removable splint at night to see if this relieves some of his symptoms.  We will schedule a left upper extremity EMG to rule out carpal tunnel syndrome.  We will give him a call with the results.  If he notices increased redness, drainage or swelling at the site, he is going to give us a call.         SERINA JOSEPH MD       As dictated by HARSHAD DYSON PA-C            D: 2020   T: 2020   MT: RIMMA      Name:     BEAU MENDOZA   MRN:      [id number]        Account:      [id number]   :      1960            Visit Date:   12/07/2020      Document: [id number]

## 2020-12-07 NOTE — PROGRESS NOTES
Chief Complaint   Patient presents with     Surgical Followup     s/p 6.5 weeks right small finger metacarpal fx pinning     Kay Magana LPN

## 2021-01-21 ENCOUNTER — OFFICE VISIT (OUTPATIENT)
Dept: NEUROLOGY | Facility: OTHER | Age: 61
End: 2021-01-21
Attending: PSYCHIATRY & NEUROLOGY
Payer: COMMERCIAL

## 2021-01-21 VITALS
SYSTOLIC BLOOD PRESSURE: 124 MMHG | DIASTOLIC BLOOD PRESSURE: 80 MMHG | RESPIRATION RATE: 20 BRPM | BODY MASS INDEX: 30.66 KG/M2 | TEMPERATURE: 97.4 F | WEIGHT: 179.6 LBS | HEIGHT: 64 IN | HEART RATE: 96 BPM | OXYGEN SATURATION: 98 %

## 2021-01-21 DIAGNOSIS — G56.02 CARPAL TUNNEL SYNDROME OF LEFT WRIST: Primary | ICD-10-CM

## 2021-01-21 PROCEDURE — 95911 NRV CNDJ TEST 9-10 STUDIES: CPT | Performed by: PSYCHIATRY & NEUROLOGY

## 2021-01-21 PROCEDURE — 95886 MUSC TEST DONE W/N TEST COMP: CPT | Mod: 26 | Performed by: PSYCHIATRY & NEUROLOGY

## 2021-01-21 PROCEDURE — 95886 MUSC TEST DONE W/N TEST COMP: CPT | Performed by: PSYCHIATRY & NEUROLOGY

## 2021-01-21 PROCEDURE — G0463 HOSPITAL OUTPT CLINIC VISIT: HCPCS | Mod: 25

## 2021-01-21 PROCEDURE — 99204 OFFICE O/P NEW MOD 45 MIN: CPT | Mod: 25 | Performed by: PSYCHIATRY & NEUROLOGY

## 2021-01-21 PROCEDURE — 95911 NRV CNDJ TEST 9-10 STUDIES: CPT | Mod: 26 | Performed by: PSYCHIATRY & NEUROLOGY

## 2021-01-21 ASSESSMENT — MIFFLIN-ST. JEOR: SCORE: 1537.17

## 2021-01-21 ASSESSMENT — PAIN SCALES - GENERAL: PAINLEVEL: SEVERE PAIN (6)

## 2021-01-21 NOTE — NURSING NOTE
Chief Complaint   Patient presents with     Numbness     left hand     Worsening numbness and weakness in left hand with occasional sharp pain. Symptoms starting to appear in right hand as well.     Had surgery aprox 3 months ago in left hand after a fall that caused a break and torn tendon.    Medication Reconciliation: complete    Luisana Dee LPN

## 2021-01-21 NOTE — LETTER
1/21/2021         RE: Beau Koch  [address]  [facility] [address]        Dear Colleague,    Thank you for referring your patient, Beau Koch, to the [facility] AND \[facility] [place] [facility]\"". Please see a copy of my visit note below.    Visit Date:   01/21/2021      NEURODIAGNOSTICS CONSULTATION      REFERRING PHYSICIAN:  Margarito Bashir MD      HISTORY OF PRESENT ILLNESS:  The patient is a 60-year-old man who relates that he has had paresthesia intermittently in both hands for several years, but on the left, which is his dominant hand, this significantly worsened just 3 months ago, when he fell onto his outstretched hand.  He believes there is a loss of  strength as well.      PAST MEDICAL HISTORY:  Significant for surgical tendon repair at the left wrist following the patient's wrist injury 3 months ago.  He has generally been healthy.      A 10-system review of systems other than the above is unremarkable.      FAMILY HISTORY:  Negative for neuropathy.      SOCIAL HISTORY:  The patient does not use tobacco.  He works as a  at [employer].  Like every one of the several hundred other patients I have seen from [facility], the patient is on medical assistance.      PHYSICAL EXAMINATION:     VITAL SIGNS:  The patient is short of stature at 65 inches.  He is overweight.  Weight is 180 pounds.  Blood pressure 124/80.     NEUROLOGIC:  There is moderately severe weakness on the left for the abductors of the thumb, with normal strength for this muscle group on the right.  There is equivocal weakness on the left for the interossei.  Strength is normal bilaterally for the finger extensors and flexors, biceps and triceps.  Reflexes are normoactive and symmetric.  Pinprick is reduced on the left in the field of the median nerve with splitting of the fourth digit.      NERVE CONDUCTION STUDIES:  Motor nerve conduction testing was performed for the left median and ulnar nerves.  There was  severe latency prolongation and slowing at the wrist for the median nerve.  This nerve showed mild amplitude reduction.  The ulnar study was normal and the H-reflex latencies were normal for both nerves.      Orthodromic mixed conduction studies were performed for the median and ulnar nerves.  Antidromic sensory studies were performed for the second digital, fifth digital and radial nerves.  Waveforms were absent for the median and second digital nerves.  For the other tested nerves, the latencies, amplitudes and conduction velocities were normal.      MONOPOLAR EMG NEEDLE EXAMINATION:  Monopolar needle exam was performed for the left first dorsal interosseous, extensor digitorum communis, flexor carpi radialis, triceps and brachioradialis.  All of the tested muscles showed normal insertional activity.  Motor units were normal in size with normal recruitment and interference.      IMPRESSION:  The patient has moderately severe left carpal tunnel syndrome.  He looks to be otherwise neurologically intact.  Based on the degree of orthodromic latency prolongation identified, resolution of the carpal tunnel syndrome is unlikely with nonsurgical treatment.      Findings were reviewed with the patient.         NIXON LARKIN MD             D: 2021   T: 2021   MT: TOMMIE      Name:     ARNULFO MENDOZA   MRN:      [id number]        Account:      [id number]   :      1960           Visit Date:   2021      Document: [id number]        Again, thank you for allowing me to participate in the care of your patient.        Sincerely,        Nixon Larkin MD

## 2021-01-21 NOTE — PROGRESS NOTES
Visit Date:   01/21/2021      NEURODIAGNOSTICS CONSULTATION      REFERRING PHYSICIAN:  Margarito Bashir MD      HISTORY OF PRESENT ILLNESS:  The patient is a 60-year-old man who relates that he has had paresthesia intermittently in both hands for several years, but on the left, which is his dominant hand, this significantly worsened just 3 months ago, when he fell onto his outstretched hand.  He believes there is a loss of  strength as well.      PAST MEDICAL HISTORY:  Significant for surgical tendon repair at the left wrist following the patient's wrist injury 3 months ago.  He has generally been healthy.      A 10-system review of systems other than the above is unremarkable.      FAMILY HISTORY:  Negative for neuropathy.      SOCIAL HISTORY:  The patient does not use tobacco.  He works as a  at [employer].  Like every one of the several hundred other patients I have seen from [facility], the patient is on medical assistance.      PHYSICAL EXAMINATION:     VITAL SIGNS:  The patient is short of stature at 65 inches.  He is overweight.  Weight is 180 pounds.  Blood pressure 124/80.     NEUROLOGIC:  There is moderately severe weakness on the left for the abductors of the thumb, with normal strength for this muscle group on the right.  There is equivocal weakness on the left for the interossei.  Strength is normal bilaterally for the finger extensors and flexors, biceps and triceps.  Reflexes are normoactive and symmetric.  Pinprick is reduced on the left in the field of the median nerve with splitting of the fourth digit.      NERVE CONDUCTION STUDIES:  Motor nerve conduction testing was performed for the left median and ulnar nerves.  There was severe latency prolongation and slowing at the wrist for the median nerve.  This nerve showed mild amplitude reduction.  The ulnar study was normal and the H-reflex latencies were normal for both nerves.      Orthodromic mixed conduction studies were  performed for the median and ulnar nerves.  Antidromic sensory studies were performed for the second digital, fifth digital and radial nerves.  Waveforms were absent for the median and second digital nerves.  For the other tested nerves, the latencies, amplitudes and conduction velocities were normal.      MONOPOLAR EMG NEEDLE EXAMINATION:  Monopolar needle exam was performed for the left first dorsal interosseous, extensor digitorum communis, flexor carpi radialis, triceps and brachioradialis.  All of the tested muscles showed normal insertional activity.  Motor units were normal in size with normal recruitment and interference.      IMPRESSION:  The patient has moderately severe left carpal tunnel syndrome.  He looks to be otherwise neurologically intact.  Based on the degree of orthodromic latency prolongation identified, resolution of the carpal tunnel syndrome is unlikely with nonsurgical treatment.      Findings were reviewed with the patient.         ALVARO LARKIN MD             D: 2021   T: 2021   MT: [id number]      Name:     ARNULFO MENDOZA   MRN:      [id number]        Account:      [id number]   :      1960           Visit Date:   2021      Document: [id number]

## 2021-05-13 ENCOUNTER — PATIENT OUTREACH (OUTPATIENT)
Dept: FAMILY MEDICINE | Facility: OTHER | Age: 61
End: 2021-05-13

## 2021-05-13 NOTE — LETTER
[facility]  [address]  [place] [address]  [phone]       May 13, 2021    Beau Koch  [facility]  [place] [address]    Dear Beau,    We care about your health and have reviewed your health plan and are making recommendations based on this review, to optimize your health.    You are in particular need of attention regarding:  -Colon Cancer Screening    We are recommending that you:  -schedule a COLONOSCOPY to look for colon cancer (due every 10 years or 5 years in higher risk situations.)        Colon cancer is now the second leading cause of cancer-related deaths in the [facility] for both men and women and there are over 130,000 new cases and 50,000 deaths per year from colon cancer.  Colonoscopies can prevent 90-95% of these deaths.  Problem lesions can be removed before they ever become cancer.  This test is not only looking for cancer, but also getting rid of precancerious lesions.    If you are under/uninsured, we recommend you contact the [employer] program. [employer] is a free colorectal cancer screening program that provides colonoscopies for eligible under/uninsured [place] men and women. If you are interested in receiving a free colonoscopy, please call [employer] at [phone] (mention code [employer]) to see if you re eligible.      If you do not wish to do a colonoscopy or cannot afford to do one, at this time, there is another option. It is called a FIT test or Fecal Immunochemical Occult Blood Test (take home stool sample kit).  It does not replace the colonoscopy for colorectal cancer screening, but it can detect hidden bleeding in the lower colon.  It does need to be repeated every year and if a positive result is obtained, you would be referred for a colonoscopy.          If you have completed either one of these tests at another facility, please call with the details of when and where the tests were done and if they were  normal or not. Or have the records sent to our clinic so that we can best coordinate your care.      In addition, here is a list of due or overdue Health Maintenance reminders.    Health Maintenance Due   Topic Date Due     Preventive Care Visit  Never done     Discuss Advance Care Planning  Never done     Pneumococcal Vaccine (1 of 2 - PPSV23) Never done     HIV Screening  Never done     COVID-19 Vaccine (1) Never done     Zoster (Shingles) Vaccine (1 of 2) Never done     Depression Assessment  07/09/2020     Colorectal Cancer Screening  08/22/2020     Diptheria Tetanus Pertussis (DTAP/TDAP/TD) Vaccine (2 - Td) 09/27/2020       To address the above recommendations, we encourage you to contact us at [phone], via [employer] or by contacting Central Scheduling toll free at [phone] 24 hours a day. They will assist you with finding the most convenient time and location.    Thank you for trusting [facility] and we appreciate the opportunity to serve you.  We look forward to supporting your healthcare needs in the future.    Healthy Regards,    Your [facility] Team

## 2021-05-25 ENCOUNTER — OFFICE VISIT (OUTPATIENT)
Dept: FAMILY MEDICINE | Facility: OTHER | Age: 61
End: 2021-05-25
Attending: FAMILY MEDICINE
Payer: COMMERCIAL

## 2021-05-25 VITALS
RESPIRATION RATE: 20 BRPM | WEIGHT: 183 LBS | BODY MASS INDEX: 30.49 KG/M2 | HEIGHT: 65 IN | SYSTOLIC BLOOD PRESSURE: 128 MMHG | HEART RATE: 96 BPM | OXYGEN SATURATION: 96 % | DIASTOLIC BLOOD PRESSURE: 80 MMHG | TEMPERATURE: 97.8 F

## 2021-05-25 DIAGNOSIS — Z01.818 PREOP GENERAL PHYSICAL EXAM: Primary | ICD-10-CM

## 2021-05-25 DIAGNOSIS — F17.210 CIGARETTE SMOKER: ICD-10-CM

## 2021-05-25 DIAGNOSIS — G56.02 CARPAL TUNNEL SYNDROME OF LEFT WRIST: ICD-10-CM

## 2021-05-25 LAB
ANION GAP SERPL CALCULATED.3IONS-SCNC: 14 MMOL/L (ref 3–14)
BUN SERPL-MCNC: 16 MG/DL (ref 7–25)
CALCIUM SERPL-MCNC: 9.8 MG/DL (ref 8.6–10.3)
CHLORIDE SERPL-SCNC: 101 MMOL/L (ref 98–107)
CO2 SERPL-SCNC: 21 MMOL/L (ref 21–31)
CREAT SERPL-MCNC: 1 MG/DL (ref 0.7–1.3)
GFR SERPL CREATININE-BSD FRML MDRD: 76 ML/MIN/{1.73_M2}
GLUCOSE SERPL-MCNC: 89 MG/DL (ref 70–105)
POTASSIUM SERPL-SCNC: 4.7 MMOL/L (ref 3.5–5.1)
SODIUM SERPL-SCNC: 136 MMOL/L (ref 134–144)

## 2021-05-25 PROCEDURE — G0463 HOSPITAL OUTPT CLINIC VISIT: HCPCS

## 2021-05-25 PROCEDURE — 93010 ELECTROCARDIOGRAM REPORT: CPT | Performed by: INTERNAL MEDICINE

## 2021-05-25 PROCEDURE — 99214 OFFICE O/P EST MOD 30 MIN: CPT | Performed by: FAMILY MEDICINE

## 2021-05-25 PROCEDURE — 36415 COLL VENOUS BLD VENIPUNCTURE: CPT | Mod: ZL | Performed by: FAMILY MEDICINE

## 2021-05-25 PROCEDURE — G0463 HOSPITAL OUTPT CLINIC VISIT: HCPCS | Mod: 25

## 2021-05-25 PROCEDURE — 93005 ELECTROCARDIOGRAM TRACING: CPT

## 2021-05-25 PROCEDURE — 80048 BASIC METABOLIC PNL TOTAL CA: CPT | Mod: ZL | Performed by: FAMILY MEDICINE

## 2021-05-25 ASSESSMENT — PAIN SCALES - GENERAL: PAINLEVEL: NO PAIN (0)

## 2021-05-25 ASSESSMENT — MIFFLIN-ST. JEOR: SCORE: 1571.96

## 2021-05-25 ASSESSMENT — PATIENT HEALTH QUESTIONNAIRE - PHQ9: SUM OF ALL RESPONSES TO PHQ QUESTIONS 1-9: 0

## 2021-05-25 NOTE — NURSING NOTE
Patient here for preop for left carpal tunnel with  on 06/14/2021 at [facility]. Medication Reconciliation: complete.    Luisa Alvarado LPN  5/25/2021 10:36 AM

## 2021-05-25 NOTE — LETTER
May 26, 2021      Beau Koch  [facility] [address]        Dear ,    We are writing to inform you of your test results.    Your test results fall within the expected range(s) or remain unchanged from previous results.  Please continue with current treatment plan.    Resulted Orders   Basic Metabolic Panel   Result Value Ref Range    Sodium 136 134 - 144 mmol/L    Potassium 4.7 3.5 - 5.1 mmol/L    Chloride 101 98 - 107 mmol/L    Carbon Dioxide 21 21 - 31 mmol/L    Anion Gap 14 3 - 14 mmol/L    Glucose 89 70 - 105 mg/dL    Urea Nitrogen 16 7 - 25 mg/dL    Creatinine 1.00 0.70 - 1.30 mg/dL    GFR Estimate 76 >60 mL/min/[1.73_m2]    GFR Estimate If Black >90 >60 mL/min/[1.73_m2]    Calcium 9.8 8.6 - 10.3 mg/dL       If you have any questions or concerns, please call the clinic at the number listed above.       Sincerely,      Taye Langford MD

## 2021-05-25 NOTE — H&P (VIEW-ONLY)
[facility]  [address]  [place] [address]  Phone: [phone]  Fax: [phone]  Primary Provider: Ghassan Langford  Pre-op Performing Provider: GHASSAN LANGFORD      PREOPERATIVE EVALUATION:  Today's date: 5/25/2021    Beau Koch is a 60 year old male who presents for a preoperative evaluation.    Surgical Information:  Surgery/Procedure: left carpal tunnel  Surgery Location: [facility]  Surgeon:   Surgery Date: 06/14/2021  Time of Surgery:   Where patient plans to recover: At home with family  Fax number for surgical facility: Note does not need to be faxed, will be available electronically in Epic.    Type of Anesthesia Anticipated: to be determined    Assessment & Plan     The proposed surgical procedure is considered INTERMEDIATE risk.    Preop general physical exam    - EKG 12-lead, tracing only (Same Day)    Carpal tunnel syndrome of left wrist      Cigarette smoker  Encouraged tobacco cessation which should help decrease risk of surgical complications.  Patient is attempting           Risks and Recommendations:  The patient has the following additional risks and recommendations for perioperative complications:   - No identified additional risk factors other than previously addressed        RECOMMENDATION:  APPROVAL GIVEN to proceed with proposed procedure, without further diagnostic evaluation.                      Subjective     HPI related to upcoming procedure: Patient arrives here for preop.  He is scheduled for carpal tunnel surgery on June 14.  He reports numbness in his hands.  2 years ago he had surgery because of the wrist fracture.  Reports has gotten worse since.  Review of the chart shows he is in need of a colonoscopy but patient is preferring Cologuard at this visit    Preop Questions 5/25/2021   1. Have you ever had a heart attack or stroke? No   2. Have you ever had surgery on your heart or blood vessels, such as a stent placement, a  coronary artery bypass, or surgery on an artery in your head, neck, heart, or legs? No   3. Do you have chest pain with activity? No   4. Do you have a history of  heart failure? No   5. Do you currently have a cold, bronchitis or symptoms of other infection? No   6. Do you have a cough, shortness of breath, or wheezing? No   7. Do you or anyone in your family have previous history of blood clots? YES - dad   8. Do you or does anyone in your family have a serious bleeding problem such as prolonged bleeding following surgeries or cuts? No   9. Have you ever had problems with anemia or been told to take iron pills? No   10. Have you had any abnormal blood loss such as black, tarry or bloody stools? No   11. Have you ever had a blood transfusion? No   12. Are you willing to have a blood transfusion if it is medically needed before, during, or after your surgery? Yes   13. Have you or any of your relatives ever had problems with anesthesia? No   14. Do you have sleep apnea, excessive snoring or daytime drowsiness? No   15. Do you have any artifical heart valves or other implanted medical devices like a pacemaker, defibrillator, or continuous glucose monitor? No   16. Do you have artificial joints? No   17. Are you allergic to latex? No       Health Care Directive:  Patient does not have a Health Care Directive or Living Will: Discussed advance care planning with patient; however, patient declined at this time.    Preoperative Review of :   reviewed - no record of controlled substances prescribed.          Review of Systems  CONSTITUTIONAL: NEGATIVE for fever, chills, change in weight  ENT/MOUTH: NEGATIVE for ear, mouth and throat problems  RESP: NEGATIVE for significant cough or SOB  CV: NEGATIVE for chest pain, palpitations or peripheral edema    Patient Active Problem List    Diagnosis Date Noted     Carpal tunnel syndrome of left wrist 01/09/2020     Priority: Medium     Sinusitis 01/07/2015     Priority:  Medium     Anxiety 03/04/2014     Priority: Medium     Drug abuse (H) 10/14/2013     Priority: Medium     Hyperlipidemia 10/14/2013     Priority: Medium     Cigarette smoker 02/27/2013     Priority: Medium     Major depression, recurrent (H) 02/27/2013     Priority: Medium      Past Medical History:   Diagnosis Date     Cigarette nicotine dependence, uncomplicated     Quit 2016     Hyperlipidemia     No Comments Provided     Major depressive disorder, single episode     No Comments Provided     Uncomplicated alcohol abuse      Past Surgical History:   Procedure Laterality Date     CLOSED REDUCTION, PERCUTANEOUS PINNING FINGER, COMBINED Left 10/23/2020    Procedure: 5th Metacarpal base   PINNING;  Surgeon: Amos Mancia MD;  Location:  OR     COLONOSCOPY  08/22/2010    WNL     Current Outpatient Medications   Medication Sig Dispense Refill     atorvastatin (LIPITOR) 20 MG tablet Take 1 tablet (20 mg) by mouth daily 90 tablet 3     buPROPion (WELLBUTRIN XL) 150 MG 24 hr tablet Take 1 tablet (150 mg) by mouth daily (Patient taking differently: Take 300 mg by mouth daily Pt stated taking 300 mg per Dr Cruz) 90 tablet 3     ibuprofen (ADVIL/MOTRIN) 200 MG tablet Take 400 mg by mouth every 4 hours as needed for mild pain       loratadine (CLARITIN) 10 MG tablet Take 1 tablet (10 mg) by mouth daily 90 tablet 3       No Known Allergies     Social History     Tobacco Use     Smoking status: Current Every Day Smoker     Packs/day: 0.25     Types: Cigarettes     Last attempt to quit: 3/7/2018     Years since quitting: 3.2     Smokeless tobacco: Never Used   Substance Use Topics     Alcohol use: No     Alcohol/week: 0.0 standard drinks     Family History   Problem Relation Age of Onset     Emphysema Father      Other - See Comments Father         Blood clots     Hypertension Mother      Hyperlipidemia Mother      Myocardial Infarction Brother      History   Drug Use Unknown         Objective     /80   Pulse 96  "  Temp 97.8  F (36.6  C)   Resp 20   Ht 1.651 m (5' 5\")   Wt 83 kg (183 lb)   SpO2 96%   BMI 30.45 kg/m      Physical Exam    GENERAL APPEARANCE: healthy, alert and no distress     EYES: EOMI,  PERRL     HENT: ear canals and TM's normal and nose and mouth without ulcers or lesions     NECK: no adenopathy,     RESP: lungs clear to auscultation - no rales, rhonchi or wheezes     CV: regular rates and rhythm, normal S1 S2, no S3 or S4 and no murmur, click or rub     ABDOMEN:  soft, nontender, no HSM or masses and bowel sounds normal     MS: extremities normal- no gross deformities noted, no evidence of inflammation in joints, FROM in all extremities.     SKIN: no suspicious lesions or rashes     NEURO: Normal strength and tone, sensory exam grossly normal, mentation intact and speech normal     PSYCH: mentation appears normal. and affect normal/bright     LYMPHATICS: No cervical adenopathy    Recent Labs   Lab Test 10/20/20  1049 01/09/20  1137   HGB 13.8  --    NA  --  138   POTASSIUM  --  4.2   CR  --  1.01        Diagnostics:  Recent Results (from the past 24 hour(s))   EKG 12-lead, tracing only (Same Day)    Collection Time: 05/25/21 10:53 AM   Result Value Ref Range    Interpretation ECG Click View Image link to view waveform and result    Basic Metabolic Panel    Collection Time: 05/25/21 11:07 AM   Result Value Ref Range    Sodium 136 134 - 144 mmol/L    Potassium 4.7 3.5 - 5.1 mmol/L    Chloride 101 98 - 107 mmol/L    Carbon Dioxide 21 21 - 31 mmol/L    Anion Gap 14 3 - 14 mmol/L    Glucose 89 70 - 105 mg/dL    Urea Nitrogen 16 7 - 25 mg/dL    Creatinine 1.00 0.70 - 1.30 mg/dL    GFR Estimate 76 >60 mL/min/[1.73_m2]    GFR Estimate If Black >90 >60 mL/min/[1.73_m2]    Calcium 9.8 8.6 - 10.3 mg/dL      EKG: appears normal, NSR, normal axis, normal intervals, no acute ST/T changes c/w ischemia    Revised Cardiac Risk Index (RCRI):  The patient has the following serious cardiovascular risks for perioperative " complications:   - No serious cardiac risks = 0 points     RCRI Interpretation: 0 points: Class I (very low risk - 0.4% complication rate)           Signed Electronically by: Taye Langford MD  Copy of this evaluation report is provided to requesting physician.

## 2021-05-26 LAB — INTERPRETATION ECG - MUSE: NORMAL

## 2021-05-27 ENCOUNTER — IMMUNIZATION (OUTPATIENT)
Dept: FAMILY MEDICINE | Facility: OTHER | Age: 61
End: 2021-05-27
Attending: FAMILY MEDICINE
Payer: COMMERCIAL

## 2021-05-27 PROCEDURE — 91300 PR COVID VAC PFIZER DIL RECON 30 MCG/0.3 ML IM: CPT

## 2021-06-10 ENCOUNTER — ALLIED HEALTH/NURSE VISIT (OUTPATIENT)
Dept: FAMILY MEDICINE | Facility: OTHER | Age: 61
End: 2021-06-10
Attending: FAMILY MEDICINE
Payer: COMMERCIAL

## 2021-06-10 DIAGNOSIS — Z23 HIGH PRIORITY FOR 2019 NOVEL CORONAVIRUS VACCINATION: Primary | ICD-10-CM

## 2021-06-10 DIAGNOSIS — Z20.822 COVID-19 RULED OUT: Primary | ICD-10-CM

## 2021-06-10 LAB
SARS-COV-2 RNA RESP QL NAA+PROBE: NORMAL
SPECIMEN SOURCE: NORMAL

## 2021-06-10 PROCEDURE — C9803 HOPD COVID-19 SPEC COLLECT: HCPCS

## 2021-06-10 PROCEDURE — U0003 INFECTIOUS AGENT DETECTION BY NUCLEIC ACID (DNA OR RNA); SEVERE ACUTE RESPIRATORY SYNDROME CORONAVIRUS 2 (SARS-COV-2) (CORONAVIRUS DISEASE [COVID-19]), AMPLIFIED PROBE TECHNIQUE, MAKING USE OF HIGH THROUGHPUT TECHNOLOGIES AS DESCRIBED BY CMS-2020-01-R: HCPCS | Mod: ZL | Performed by: FAMILY MEDICINE

## 2021-06-10 PROCEDURE — U0005 INFEC AGEN DETEC AMPLI PROBE: HCPCS | Mod: ZL | Performed by: FAMILY MEDICINE

## 2021-06-10 NOTE — NURSING NOTE
Patient here today for covid testing.    Pre-op Mckay, 6/14/2021, JUD Magana LPN....................  6/10/2021   10:48 AM

## 2021-06-11 ENCOUNTER — ANESTHESIA EVENT (OUTPATIENT)
Dept: SURGERY | Facility: OTHER | Age: 61
End: 2021-06-11
Payer: COMMERCIAL

## 2021-06-11 LAB
LABORATORY COMMENT REPORT: NORMAL
SARS-COV-2 RNA RESP QL NAA+PROBE: NEGATIVE
SPECIMEN SOURCE: NORMAL

## 2021-06-14 ENCOUNTER — ANESTHESIA (OUTPATIENT)
Dept: SURGERY | Facility: OTHER | Age: 61
End: 2021-06-14
Payer: COMMERCIAL

## 2021-06-14 ENCOUNTER — HOSPITAL ENCOUNTER (OUTPATIENT)
Facility: OTHER | Age: 61
Discharge: HOME | End: 2021-06-14
Attending: ORTHOPAEDIC SURGERY | Admitting: ORTHOPAEDIC SURGERY
Payer: COMMERCIAL

## 2021-06-14 VITALS
WEIGHT: 183 LBS | HEART RATE: 71 BPM | SYSTOLIC BLOOD PRESSURE: 120 MMHG | DIASTOLIC BLOOD PRESSURE: 85 MMHG | HEIGHT: 65 IN | BODY MASS INDEX: 30.49 KG/M2 | OXYGEN SATURATION: 93 %

## 2021-06-14 DIAGNOSIS — G56.02 CARPAL TUNNEL SYNDROME OF LEFT WRIST: Primary | ICD-10-CM

## 2021-06-14 PROCEDURE — 370N000017 HC ANESTHESIA TECHNICAL FEE, PER MIN: Performed by: ORTHOPAEDIC SURGERY

## 2021-06-14 PROCEDURE — 999N000141 HC STATISTIC PRE-PROCEDURE NURSING ASSESSMENT: Performed by: ORTHOPAEDIC SURGERY

## 2021-06-14 PROCEDURE — 250N000009 HC RX 250: Performed by: NURSE ANESTHETIST, CERTIFIED REGISTERED

## 2021-06-14 PROCEDURE — 250N000013 HC RX MED GY IP 250 OP 250 PS 637: Performed by: ORTHOPAEDIC SURGERY

## 2021-06-14 PROCEDURE — 64721 CARPAL TUNNEL SURGERY: CPT | Mod: LT | Performed by: ORTHOPAEDIC SURGERY

## 2021-06-14 PROCEDURE — 710N000012 HC RECOVERY PHASE 2, PER MINUTE: Performed by: ORTHOPAEDIC SURGERY

## 2021-06-14 PROCEDURE — 272N000001 HC OR GENERAL SUPPLY STERILE: Performed by: ORTHOPAEDIC SURGERY

## 2021-06-14 PROCEDURE — 250N000011 HC RX IP 250 OP 636: Performed by: NURSE ANESTHETIST, CERTIFIED REGISTERED

## 2021-06-14 PROCEDURE — 64721 CARPAL TUNNEL SURGERY: CPT | Performed by: NURSE ANESTHETIST, CERTIFIED REGISTERED

## 2021-06-14 PROCEDURE — 250N000011 HC RX IP 250 OP 636: Performed by: ORTHOPAEDIC SURGERY

## 2021-06-14 PROCEDURE — 250N000009 HC RX 250: Performed by: ORTHOPAEDIC SURGERY

## 2021-06-14 PROCEDURE — 258N000003 HC RX IP 258 OP 636: Performed by: NURSE ANESTHETIST, CERTIFIED REGISTERED

## 2021-06-14 PROCEDURE — 360N000075 HC SURGERY LEVEL 2, PER MIN: Performed by: ORTHOPAEDIC SURGERY

## 2021-06-14 RX ORDER — MEPERIDINE HYDROCHLORIDE 50 MG/ML
12.5 INJECTION INTRAMUSCULAR; INTRAVENOUS; SUBCUTANEOUS
Status: DISCONTINUED | OUTPATIENT
Start: 2021-06-14 | End: 2021-06-14 | Stop reason: HOSPADM

## 2021-06-14 RX ORDER — SODIUM CHLORIDE, SODIUM LACTATE, POTASSIUM CHLORIDE, CALCIUM CHLORIDE 600; 310; 30; 20 MG/100ML; MG/100ML; MG/100ML; MG/100ML
INJECTION, SOLUTION INTRAVENOUS CONTINUOUS
Status: DISCONTINUED | OUTPATIENT
Start: 2021-06-14 | End: 2021-06-14 | Stop reason: HOSPADM

## 2021-06-14 RX ORDER — FENTANYL CITRATE 50 UG/ML
25-50 INJECTION, SOLUTION INTRAMUSCULAR; INTRAVENOUS
Status: DISCONTINUED | OUTPATIENT
Start: 2021-06-14 | End: 2021-06-14 | Stop reason: HOSPADM

## 2021-06-14 RX ORDER — HYDROMORPHONE HYDROCHLORIDE 1 MG/ML
.3-.5 INJECTION, SOLUTION INTRAMUSCULAR; INTRAVENOUS; SUBCUTANEOUS EVERY 10 MIN PRN
Status: DISCONTINUED | OUTPATIENT
Start: 2021-06-14 | End: 2021-06-14 | Stop reason: HOSPADM

## 2021-06-14 RX ORDER — ONDANSETRON 4 MG/1
4 TABLET, ORALLY DISINTEGRATING ORAL EVERY 30 MIN PRN
Status: DISCONTINUED | OUTPATIENT
Start: 2021-06-14 | End: 2021-06-14 | Stop reason: HOSPADM

## 2021-06-14 RX ORDER — KETOROLAC TROMETHAMINE 30 MG/ML
INJECTION, SOLUTION INTRAMUSCULAR; INTRAVENOUS PRN
Status: DISCONTINUED | OUTPATIENT
Start: 2021-06-14 | End: 2021-06-14

## 2021-06-14 RX ORDER — NALOXONE HYDROCHLORIDE 0.4 MG/ML
0.2 INJECTION, SOLUTION INTRAMUSCULAR; INTRAVENOUS; SUBCUTANEOUS
Status: DISCONTINUED | OUTPATIENT
Start: 2021-06-14 | End: 2021-06-14 | Stop reason: HOSPADM

## 2021-06-14 RX ORDER — LIDOCAINE 40 MG/G
CREAM TOPICAL
Status: DISCONTINUED | OUTPATIENT
Start: 2021-06-14 | End: 2021-06-14 | Stop reason: HOSPADM

## 2021-06-14 RX ORDER — HYDROCODONE BITARTRATE AND ACETAMINOPHEN 5; 325 MG/1; MG/1
1-2 TABLET ORAL EVERY 4 HOURS PRN
Qty: 10 TABLET | Refills: 0 | Status: SHIPPED | OUTPATIENT
Start: 2021-06-14

## 2021-06-14 RX ORDER — NALOXONE HYDROCHLORIDE 0.4 MG/ML
0.4 INJECTION, SOLUTION INTRAMUSCULAR; INTRAVENOUS; SUBCUTANEOUS
Status: DISCONTINUED | OUTPATIENT
Start: 2021-06-14 | End: 2021-06-14 | Stop reason: HOSPADM

## 2021-06-14 RX ORDER — GINSENG 100 MG
CAPSULE ORAL PRN
Status: DISCONTINUED | OUTPATIENT
Start: 2021-06-14 | End: 2021-06-14 | Stop reason: HOSPADM

## 2021-06-14 RX ORDER — PROPOFOL 10 MG/ML
INJECTION, EMULSION INTRAVENOUS PRN
Status: DISCONTINUED | OUTPATIENT
Start: 2021-06-14 | End: 2021-06-14

## 2021-06-14 RX ORDER — IBUPROFEN 200 MG
600 TABLET ORAL
Status: DISCONTINUED | OUTPATIENT
Start: 2021-06-14 | End: 2021-06-14 | Stop reason: HOSPADM

## 2021-06-14 RX ORDER — ONDANSETRON 2 MG/ML
4 INJECTION INTRAMUSCULAR; INTRAVENOUS EVERY 30 MIN PRN
Status: DISCONTINUED | OUTPATIENT
Start: 2021-06-14 | End: 2021-06-14 | Stop reason: HOSPADM

## 2021-06-14 RX ORDER — HYDROCODONE BITARTRATE AND ACETAMINOPHEN 5; 325 MG/1; MG/1
1 TABLET ORAL
Status: COMPLETED | OUTPATIENT
Start: 2021-06-14 | End: 2021-06-14

## 2021-06-14 RX ORDER — PROPOFOL 10 MG/ML
INJECTION, EMULSION INTRAVENOUS CONTINUOUS PRN
Status: DISCONTINUED | OUTPATIENT
Start: 2021-06-14 | End: 2021-06-14

## 2021-06-14 RX ORDER — ONDANSETRON 2 MG/ML
INJECTION INTRAMUSCULAR; INTRAVENOUS PRN
Status: DISCONTINUED | OUTPATIENT
Start: 2021-06-14 | End: 2021-06-14

## 2021-06-14 RX ORDER — BUPIVACAINE HYDROCHLORIDE 2.5 MG/ML
INJECTION, SOLUTION EPIDURAL; INFILTRATION; INTRACAUDAL PRN
Status: DISCONTINUED | OUTPATIENT
Start: 2021-06-14 | End: 2021-06-14 | Stop reason: HOSPADM

## 2021-06-14 RX ORDER — LIDOCAINE HYDROCHLORIDE 20 MG/ML
INJECTION, SOLUTION INFILTRATION; PERINEURAL PRN
Status: DISCONTINUED | OUTPATIENT
Start: 2021-06-14 | End: 2021-06-14

## 2021-06-14 RX ADMIN — ONDANSETRON 4 MG: 2 INJECTION INTRAMUSCULAR; INTRAVENOUS at 09:39

## 2021-06-14 RX ADMIN — HYDROCODONE BITARTRATE AND ACETAMINOPHEN 1 TABLET: 5; 325 TABLET ORAL at 10:31

## 2021-06-14 RX ADMIN — PROPOFOL 100 MG: 10 INJECTION, EMULSION INTRAVENOUS at 09:39

## 2021-06-14 RX ADMIN — PROPOFOL 140 MCG/KG/MIN: 10 INJECTION, EMULSION INTRAVENOUS at 09:39

## 2021-06-14 RX ADMIN — LIDOCAINE HYDROCHLORIDE 60 MG: 20 INJECTION, SOLUTION INFILTRATION; PERINEURAL at 09:39

## 2021-06-14 RX ADMIN — KETOROLAC TROMETHAMINE 30 MG: 30 INJECTION, SOLUTION INTRAMUSCULAR at 09:39

## 2021-06-14 RX ADMIN — SODIUM CHLORIDE, POTASSIUM CHLORIDE, SODIUM LACTATE AND CALCIUM CHLORIDE: 600; 310; 30; 20 INJECTION, SOLUTION INTRAVENOUS at 08:01

## 2021-06-14 ASSESSMENT — MIFFLIN-ST. JEOR: SCORE: 1571.96

## 2021-06-14 ASSESSMENT — LIFESTYLE VARIABLES: TOBACCO_USE: 1

## 2021-06-14 NOTE — ANESTHESIA PREPROCEDURE EVALUATION
Anesthesia Pre-Procedure Evaluation    Patient: Beau Koch   MRN: [id number] : 1960        Preoperative Diagnosis: Wrist pain [M25.539]   Procedure : Procedure(s):  RELEASE, CARPAL TUNNEL     Past Medical History:   Diagnosis Date     Cigarette nicotine dependence, uncomplicated     Quit      Hyperlipidemia     No Comments Provided     Major depressive disorder, single episode     No Comments Provided     Uncomplicated alcohol abuse       Past Surgical History:   Procedure Laterality Date     CLOSED REDUCTION, PERCUTANEOUS PINNING FINGER, COMBINED Left 10/23/2020    Procedure: 5th Metacarpal base   PINNING;  Surgeon: Amos Mancia MD;  Location: [facility] OR     COLONOSCOPY  2010    WNL      No Known Allergies   Social History     Tobacco Use     Smoking status: Current Every Day Smoker     Packs/day: 0.25     Types: Cigarettes     Last attempt to quit: 3/7/2018     Years since quitting: 3.2     Smokeless tobacco: Never Used   Substance Use Topics     Alcohol use: No     Alcohol/week: 0.0 standard drinks      Wt Readings from Last 1 Encounters:   21 83 kg (183 lb)        Anesthesia Evaluation   Pt has had prior anesthetic.         ROS/MED HX  ENT/Pulmonary:     (+) tobacco use, Current use,     Neurologic:  - neg neurologic ROS     Cardiovascular:     (+) Dyslipidemia -----    METS/Exercise Tolerance: >4 METS    Hematologic:  - neg hematologic  ROS     Musculoskeletal:   (+) arthritis,     GI/Hepatic:  - neg GI/hepatic ROS     Renal/Genitourinary:  - neg Renal ROS     Endo:  - neg endo ROS     Psychiatric/Substance Use:     (+) alcohol abuse : HX of drug abuse, Patient sttedd that he hasn't done in drugs in 3 years.    Infectious Disease:  - neg infectious disease ROS     Malignancy:  - neg malignancy ROS     Other:  - neg other ROS          Physical Exam    Airway        Mallampati: III   TM distance: > 3 FB   Neck ROM: limited   Mouth opening: > 3 cm    Respiratory Devices and  Support         Dental       (+) chipped and missing    B=Bridge, C=Chipped, L=Loose, M=Missing    Cardiovascular   cardiovascular exam normal       Rhythm and rate: regular and normal     Pulmonary   pulmonary exam normal        breath sounds clear to auscultation           OUTSIDE LABS:  CBC:   Lab Results   Component Value Date    WBC 7.8 12/31/2018    HGB 13.8 10/20/2020    HGB 14.3 12/31/2018    HCT 43.2 12/31/2018    HCT 43.2 02/27/2017     12/31/2018     02/27/2017     BMP:   Lab Results   Component Value Date     05/25/2021     01/09/2020    POTASSIUM 4.7 05/25/2021    POTASSIUM 4.2 01/09/2020    CHLORIDE 101 05/25/2021    CHLORIDE 103 01/09/2020    CO2 21 05/25/2021    CO2 27 01/09/2020    BUN 16 05/25/2021    BUN 27 (H) 01/09/2020    CR 1.00 05/25/2021    CR 1.01 01/09/2020    GLC 89 05/25/2021     01/09/2020     COAGS: No results found for: PTT, INR, FIBR  POC: No results found for: BGM, HCG, HCGS  HEPATIC:   Lab Results   Component Value Date    ALBUMIN 4.6 12/31/2018    PROTTOTAL 7.3 12/31/2018    ALT 32 12/31/2018    AST 21 12/31/2018    ALKPHOS 59 12/31/2018    BILITOTAL 0.5 12/31/2018     OTHER:   Lab Results   Component Value Date    RENEE 9.8 05/25/2021       Anesthesia Plan    ASA Status:  2   NPO Status:  NPO Appropriate    Anesthesia Type: MAC.     - Reason for MAC: straight local not clinically adequate              Consents    Anesthesia Plan(s) and associated risks, benefits, and realistic alternatives discussed. Questions answered and patient/representative(s) expressed understanding.     - Discussed with:  Patient         Postoperative Care       PONV prophylaxis: Ondansetron (or other 5HT-3), Dexamethasone or Solumedrol     Comments:                David Kellerman, APRN CRNA

## 2021-06-14 NOTE — OP NOTE
Procedure Date: 2021    POSTOPERATIVE DIAGNOSIS:  Left carpal tunnel syndrome.    POSTOPERATIVE DIAGNOSIS:  Left carpal tunnel syndrome.    PROCEDURE PERFORMED:  Left carpal tunnel release.    SURGEON:  Margarito Bashir MD    ASSISTANT:  None.    ANESTHESIA:  Local with MAC.    INDICATIONS FOR PROCEDURE:  Arnulfo Koch is a 60-year-old gentleman with left carpal tunnel syndrome.  He can no longer tolerate his hand in this fashion and having exhausted all conservative measures for treatment, he wished to proceed forward with a left carpal tunnel release.  All the risks and benefits of surgical intervention were discussed with him and he wished to proceed.    DESCRIPTION OF PROCEDURE:  The patient was taken to the operating room.  After adequate induction of anesthesia, he was positioned, prepped and draped in usual sterile fashion on the operative table.  Universal timeout was initiated.  Once all in the room were in agreement, an incision was made overlying the transverse carpal ligament of the left hand.  This was carried down to subcutaneous tissues down to the palmar fascia.  Palmar fascia was incised in line with the limb.  Retractors were placed deeper.  The transverse carpal ligament was then encountered and incised to its distal most extent.  Metzenbaum scissors were used to clear soft tissue from the volar and dorsal surface of the ligament and then sectioned the ligament in line with the limb.  This concluded the procedure.  The wound was copiously irrigated and closed with 4-0 nylon in horizontal mattress suture fashion.  The patient tolerated the procedure well.  Counts were correct at the end of procedure, he was taken in stable condition to the postanesthesia care unit.    Margarito Bashir MD        D: 2021   T: 2021   MT: [id number]    Name:     ARNULFO KOCH  MRN:      [id number]        Account:        [id number]   :      1960           Procedure Date: 2021      Document: [id number]

## 2021-06-14 NOTE — ANESTHESIA CARE TRANSFER NOTE
Patient: Beau Koch    Procedure(s):  RELEASE, CARPAL TUNNEL    Diagnosis: Wrist pain [M25.539]  Diagnosis Additional Information: No value filed.    Anesthesia Type:   MAC     Note:      Level of Consciousness: awake  Oxygen Supplementation: room air    Independent Airway: airway patency satisfactory and stable  Dentition: dentition unchanged  Vital Signs Stable: post-procedure vital signs reviewed and stable  Report to RN Given: handoff report given  Patient transferred to: Phase II    Handoff Report: Identifed the Patient, Identified the Reponsible Provider, Reviewed the pertinent medical history, Discussed the surgical course, Reviewed Intra-OP anesthesia mangement and issues during anesthesia, Set expectations for post-procedure period and Allowed opportunity for questions and acknowledgement of understanding      Vitals: (Last set prior to Anesthesia Care Transfer)  CRNA VITALS  6/14/2021 0937 - 6/14/2021 1007      6/14/2021             Pulse:  82    Ht Rate:  82    SpO2:  97 %    Resp Rate (set):  10        Electronically Signed By: HIGINIO Melendez CRNA  June 14, 2021  10:07 AM

## 2021-06-14 NOTE — OR NURSING
patient has been discharged to home at 1055 via ambulatory accompanied by sister    Written discharge instructions were provided to patient.  Prescriptions were printed and given to patient.      Patient and adult caring for them verbalize understanding of discharge instructions including no driving until tomorrow and no longer taking narcotic pain medications - no operating mechanical equipment and no making any important decisions.They understand reason for discharge, and necessary follow-up appointments.

## 2021-06-14 NOTE — ANESTHESIA POSTPROCEDURE EVALUATION
Patient: Beau Koch    Procedure(s):  RELEASE, CARPAL TUNNEL    Diagnosis:Wrist pain [M25.539]  Diagnosis Additional Information: No value filed.    Anesthesia Type:  MAC    Note:  Disposition: Outpatient   Postop Pain Control:            Sign Out: Well controlled pain   PONV: No   Neuro/Psych:             Sign Out: Acceptable/Baseline neuro status   Airway/Respiratory:             Sign Out: Acceptable/Baseline resp. status   CV/Hemodynamics:             Sign Out: Acceptable CV status   Other NRE: NONE   DID A NON-ROUTINE EVENT OCCUR? No           Last vitals:  Vitals:    06/14/21 1010 06/14/21 1015 06/14/21 1030   BP: 114/76 109/87 120/85   Pulse: 78 74 71   SpO2: 94% 92% 93%       Last vitals prior to Anesthesia Care Transfer:  CRNA VITALS  6/14/2021 0937 - 6/14/2021 1037      6/14/2021             Resp Rate (set):  10          Electronically Signed By: David Kellerman, APRN CRNA  June 14, 2021  11:45 AM

## 2021-06-14 NOTE — DISCHARGE INSTRUCTIONS
[facility]  Adult Discharge Orders & Instructions    ________________________________________________________________          For 12 hours after surgery  1. Get plenty of rest.  A responsible adult must stay with you for at least 12 hours after you leave the hospital.   2. You may feel lightheaded.  IF so, sit for a few minutes before standing.  Have someone help you get up.   3. You may have a slight fever. Call the doctor if your fever is over 101 F (38.3 C) (taken under the tongue) or lasts longer than 24 hours.  4. You may have a dry mouth, a sore throat, muscle aches or trouble sleeping.  These should go away after 24 hours.  5. Do not make important or legal decisions.  6.   Do not drive or use heavy equipment.  If you have weakness or tingling, don't drive or use heavy equipment until this feeling goes away.    To contact a doctor, call   [phone]_______________________

## 2021-06-17 ENCOUNTER — IMMUNIZATION (OUTPATIENT)
Dept: FAMILY MEDICINE | Facility: OTHER | Age: 61
End: 2021-06-17
Attending: FAMILY MEDICINE
Payer: COMMERCIAL

## 2021-06-17 PROCEDURE — 91300 PR COVID VAC PFIZER DIL RECON 30 MCG/0.3 ML IM: CPT

## 2021-06-28 ENCOUNTER — OFFICE VISIT (OUTPATIENT)
Dept: ORTHOPEDICS | Facility: OTHER | Age: 61
End: 2021-06-28
Attending: ORTHOPAEDIC SURGERY
Payer: COMMERCIAL

## 2021-06-28 DIAGNOSIS — M79.642 PAIN OF LEFT HAND: Primary | ICD-10-CM

## 2021-06-28 PROCEDURE — 99024 POSTOP FOLLOW-UP VISIT: CPT | Performed by: ORTHOPAEDIC SURGERY

## 2021-06-28 PROCEDURE — G0463 HOSPITAL OUTPT CLINIC VISIT: HCPCS

## 2021-06-28 NOTE — PROGRESS NOTES
Patient is here for follow up on his left carpal tunnel release.   Vikki Arambula LPN .....................6/28/2021 1:12 PM

## 2021-06-28 NOTE — PROGRESS NOTES
Visit Date: 2021    He is now 2 weeks status post left carpal tunnel release, doing really well at this point.  No complaints with his hand whatsoever.  His sutures were removed today.  He is neuro and vascularly intact still, but still subjectively numb in the thumb; however, but otherwise he is grossly neuro and vascularly intact.  We are going to see him back on an as-needed basis.    Margarito Bashir MD        D: 2021   T: 2021   MT: [facility]    Name:     ARNULFO MENDOZA  MRN:      [id number]        Account:    [id number]   :      1960           Visit Date: 2021     Document: [id number]

## 2021-10-11 DIAGNOSIS — J32.9 CHRONIC SINUSITIS, UNSPECIFIED LOCATION: ICD-10-CM

## 2021-10-11 DIAGNOSIS — E78.5 HYPERLIPIDEMIA, UNSPECIFIED HYPERLIPIDEMIA TYPE: ICD-10-CM

## 2021-10-12 RX ORDER — ATORVASTATIN CALCIUM 20 MG/1
20 TABLET, FILM COATED ORAL DAILY
Qty: 90 TABLET | Refills: 2 | Status: SHIPPED | OUTPATIENT
Start: 2021-10-12

## 2021-10-12 RX ORDER — LORATADINE 10 MG/1
1 TABLET ORAL DAILY
Qty: 90 TABLET | Refills: 2 | Status: SHIPPED | OUTPATIENT
Start: 2021-10-12

## 2021-10-12 NOTE — TELEPHONE ENCOUNTER
"[id number] sent Rx request for the following:   atorvastatin (LIPITOR) 20 MG tablet  SigTAKE 1 TABLET (20 MG) BY MOUTH DAILY    Last Prescription Date:   10/20/2020  Last Fill Qty/Refills:         90, R-3    Statins Protocol Passed - 10/11/2021  1:00 AM       Passed - LDL on file in past 12 months    Recent Labs   Lab Test 10/20/20  1049   *            Passed - No abnormal creatine kinase in past 12 months    No lab results found.            Passed - Recent (12 mo) or future (30 days) visit within the authorizing provider's specialty    Patient has had an office visit with the authorizing provider or a provider within the authorizing providers department within the previous 12 mos or has a future within next 30 days. See \"Patient Info\" tab in inbasket, or \"Choose Columns\" in Meds & Orders section of the refill encounter.             Passed - Medication is active on med list       Passed - Patient is age 18 or older     loratadine (CLARITIN) 10 MG tablet  SigTAKE 1 TABLET (10 MG) BY MOUTH DAILY    Last Prescription Date:   10/20/2020  Last Fill Qty/Refills:         90, R-3    Antihistamines Protocol Passed - 10/11/2021  1:00 AM       Passed - Patient is 3-64 years of age    Apply weight-based dosing for peds patients age 3 - 12 years of age.    Forward request to provider for patients under the age of 3 or over the age of 64.         Passed - Recent (12 mo) or future (30 days) visit within the authorizing provider's specialty    Patient has had an office visit with the authorizing provider or a provider within the authorizing providers department within the previous 12 mos or has a future within next 30 days. See \"Patient Info\" tab in inbasket, or \"Choose Columns\" in Meds & Orders section of the refill encounter.             Passed - Medication is active on med list     Last Office Visit:              05/25/2021 (Anastasiya)   Future Office visit:           None noted    Prescription approved per [facility] " Refill Protocol.  Kera Williamson RN ....................  10/12/2021   3:41 PM

## 2023-03-01 ENCOUNTER — HOSPITAL ENCOUNTER (EMERGENCY)
Facility: OTHER | Age: 63
Discharge: HOME | End: 2023-03-01
Admitting: PHYSICIAN ASSISTANT
Payer: COMMERCIAL

## 2023-03-01 VITALS
SYSTOLIC BLOOD PRESSURE: 165 MMHG | TEMPERATURE: 97 F | RESPIRATION RATE: 16 BRPM | HEART RATE: 104 BPM | DIASTOLIC BLOOD PRESSURE: 87 MMHG | OXYGEN SATURATION: 97 %

## 2023-03-01 DIAGNOSIS — K08.89 PAIN, DENTAL: ICD-10-CM

## 2023-03-01 PROCEDURE — 64400 NJX AA&/STRD TRIGEMINAL NRV: CPT

## 2023-03-01 PROCEDURE — 99283 EMERGENCY DEPT VISIT LOW MDM: CPT | Mod: 25

## 2023-03-01 PROCEDURE — 64400 NJX AA&/STRD TRIGEMINAL NRV: CPT | Performed by: PHYSICIAN ASSISTANT

## 2023-03-01 PROCEDURE — 250N000009 HC RX 250: Performed by: PHYSICIAN ASSISTANT

## 2023-03-01 RX ORDER — BUPIVACAINE HYDROCHLORIDE 5 MG/ML
5 INJECTION, SOLUTION EPIDURAL; INTRACAUDAL ONCE
Status: COMPLETED | OUTPATIENT
Start: 2023-03-01 | End: 2023-03-01

## 2023-03-01 RX ADMIN — BUPIVACAINE HYDROCHLORIDE 25 MG: 5 INJECTION, SOLUTION EPIDURAL; INTRACAUDAL; PERINEURAL at 14:52

## 2023-03-01 ASSESSMENT — ACTIVITIES OF DAILY LIVING (ADL): ADLS_ACUITY_SCORE: 33

## 2023-03-01 NOTE — DISCHARGE INSTRUCTIONS
You were seen in the emergency department today for dental plan a dental block was performed.  You are also being sent home with antibiotics please take this twice a day for 7 days.  Please take 1000 mg of Tylenol every 8 hours and 800 mg of ibuprofen every 8 hours.  If you have a fever greater than 104 degrees please return to an ER.

## 2023-03-01 NOTE — ED PROVIDER NOTES
History     Chief Complaint   Patient presents with     Dental Problem     HPI  Beau Koch is a 62 year old male who arrives to the emergency department with dental pain in his left lower front teeth.  Patient has a past medical history of sinusitis anxiety, drug abuse, high cholesterol, cigarette smoker and major depression.  Patient states he was eating food the other day and chipped his lower teeth he is here for pain and seeking antibiotics to prevent abscess.  He is not any chills sweats no headaches no blurry vision no double vision no difficulty swallowing no chest pain no shortness of breath no abdominal pain and he has been using Tylenol or ibuprofen.    Allergies:  No Known Allergies    Problem List:    Patient Active Problem List    Diagnosis Date Noted     Carpal tunnel syndrome of left wrist 01/09/2020     Priority: Medium     Sinusitis 01/07/2015     Priority: Medium     Anxiety 03/04/2014     Priority: Medium     Drug abuse (H) 10/14/2013     Priority: Medium     Hyperlipidemia 10/14/2013     Priority: Medium     Cigarette smoker 02/27/2013     Priority: Medium     Major depression, recurrent (H) 02/27/2013     Priority: Medium        Past Medical History:    Past Medical History:   Diagnosis Date     Cigarette nicotine dependence, uncomplicated      Hyperlipidemia      Major depressive disorder, single episode      Uncomplicated alcohol abuse        Past Surgical History:    Past Surgical History:   Procedure Laterality Date     CLOSED REDUCTION, PERCUTANEOUS PINNING FINGER, COMBINED Left 10/23/2020    Procedure: 5th Metacarpal base   PINNING;  Surgeon: Amos Mancia MD;  Location:  OR     COLONOSCOPY  08/22/2010    WN     HC REMOVE TONSILS/ADENOIDS,<11 Y/O      Description: Tonsillectomy With Adenoidectomy;  Recorded: 06/15/2007;     RELEASE CARPAL TUNNEL Left 6/14/2021    Procedure: RELEASE, CARPAL TUNNEL;  Surgeon: Margarito Bashir MD;  Location:  OR       Family History:     Family History   Problem Relation Age of Onset     Emphysema Father      Other - See Comments Father         Blood clots     Hypertension Mother      Hyperlipidemia Mother      Myocardial Infarction Brother        Social History:  Marital Status:  Single [1]  Social History     Tobacco Use     Smoking status: Every Day     Packs/day: 0.25     Types: Cigarettes     Last attempt to quit: 3/7/2018     Years since quittin.9     Smokeless tobacco: Never   Substance Use Topics     Alcohol use: No     Alcohol/week: 0.0 standard drinks     Drug use: Not Currently     Types: Amphetamines        Medications:    amoxicillin-clavulanate (AUGMENTIN) 875-125 MG tablet  atorvastatin (LIPITOR) 20 MG tablet  buPROPion (WELLBUTRIN XL) 150 MG 24 hr tablet  HYDROcodone-acetaminophen (NORCO) 5-325 MG tablet  ibuprofen (ADVIL/MOTRIN) 200 MG tablet  loratadine (CLARITIN) 10 MG tablet          Review of Systems  ROS is listed in HPI  Physical Exam   BP: (!) 165/87  Pulse: 104  Temp: 97  F (36.1  C)  Resp: 16  SpO2: 97 %      Physical Exam  Vitals: Afebrile, pulse 104, blood pressure 165/87 is 97% on room air  General: alert, oriented to person, place, time  Head: atraumatic  Eyes: PERRL, corneas clear and conjunctivae clear  Mouth/Throat: Multiple teeth his front lower 2 through 24 are chipped there is some redness at the base  Neck: no tenderness, supple and no adenopathy  Chest/Pulmonary: Equal bilateral chest wall rise no signs distress no signs tachypnea no signs cyanosis  Musculoskeletal/Extremities: normal extremities, no edema, erythema, tenderness and 5/5 strength in all 4 extremities  Skin: no diaphoresis and skin color normal  Neuro: speech clear and gait stable  Psychiatric: affect/mood normal, cooperative, normal judgement/insight and memory intact  ED Course                 Procedures           No results found for this or any previous visit (from the past 24 hour(s)).    Medications   bupivacaine (MARCAINE) 0.5%  preservative free injection (25 mg Dental $Given by Other 3/1/23 8256)       Assessments & Plan (with Medical Decision Making)     I have reviewed the nursing notes.    I have reviewed the findings, diagnosis, plan and need for follow up with the patient.          Medical Decision Making    Inferior alveolar nerve block was placed on the left side will discharge home with dental coverage.  Healthcare provider was also exposed to a needlestick during this patient.        New Prescriptions    AMOXICILLIN-CLAVULANATE (AUGMENTIN) 875-125 MG TABLET    Take 1 tablet by mouth 2 times daily for 7 days       Final diagnoses:   Pain, dental       3/1/2023   [facility]     Joel Brady PA-C  03/01/23 4705

## 2023-03-01 NOTE — ED TRIAGE NOTES
Patient presents to ER with CO dental pain in the L lower canine. Patient was unable to sleep due to pain. Seeking antibiotics. Has dental appointment on Monday. BP (!) 165/87   Pulse 104   Temp 97  F (36.1  C)   Resp 16   SpO2 97%        Triage Assessment     Row Name 03/01/23 1431       Triage Assessment (Adult)    Airway WDL WDL       Respiratory WDL    Respiratory WDL WDL       Skin Circulation/Temperature WDL    Skin Circulation/Temperature WDL WDL       Cardiac WDL    Cardiac WDL WDL       Peripheral/Neurovascular WDL    Peripheral Neurovascular WDL WDL       Cognitive/Neuro/Behavioral WDL    Cognitive/Neuro/Behavioral WDL WDL

## 2024-01-01 NOTE — PROGRESS NOTES
[facility] [place]  [address]  [place] [address]  Phone: [phone]  Fax: [phone]  Primary Provider: Ghassan Langford  Pre-op Performing Provider: GHASSAN LANGFORD      PREOPERATIVE EVALUATION:  Today's date: 5/25/2021    Beau Koch is a 60 year old male who presents for a preoperative evaluation.    Surgical Information:  Surgery/Procedure: left carpal tunnel  Surgery Location: [facility]  Surgeon:   Surgery Date: 06/14/2021  Time of Surgery:   Where patient plans to recover: At home with family  Fax number for surgical facility: Note does not need to be faxed, will be available electronically in Epic.    Type of Anesthesia Anticipated: to be determined    Assessment & Plan     The proposed surgical procedure is considered INTERMEDIATE risk.    Preop general physical exam    - EKG 12-lead, tracing only (Same Day)    Carpal tunnel syndrome of left wrist      Cigarette smoker  Encouraged tobacco cessation which should help decrease risk of surgical complications.  Patient is attempting           Risks and Recommendations:  The patient has the following additional risks and recommendations for perioperative complications:   - No identified additional risk factors other than previously addressed        RECOMMENDATION:  APPROVAL GIVEN to proceed with proposed procedure, without further diagnostic evaluation.                      Subjective     HPI related to upcoming procedure: Patient arrives here for preop.  He is scheduled for carpal tunnel surgery on June 14.  He reports numbness in his hands.  2 years ago he had surgery because of the wrist fracture.  Reports has gotten worse since.  Review of the chart shows he is in need of a colonoscopy but patient is preferring Cologuard at this visit    Preop Questions 5/25/2021   1. Have you ever had a heart attack or stroke? No   2. Have you ever had surgery on your heart or blood vessels, such as a stent placement, a  coronary artery bypass, or surgery on an artery in your head, neck, heart, or legs? No   3. Do you have chest pain with activity? No   4. Do you have a history of  heart failure? No   5. Do you currently have a cold, bronchitis or symptoms of other infection? No   6. Do you have a cough, shortness of breath, or wheezing? No   7. Do you or anyone in your family have previous history of blood clots? YES - dad   8. Do you or does anyone in your family have a serious bleeding problem such as prolonged bleeding following surgeries or cuts? No   9. Have you ever had problems with anemia or been told to take iron pills? No   10. Have you had any abnormal blood loss such as black, tarry or bloody stools? No   11. Have you ever had a blood transfusion? No   12. Are you willing to have a blood transfusion if it is medically needed before, during, or after your surgery? Yes   13. Have you or any of your relatives ever had problems with anesthesia? No   14. Do you have sleep apnea, excessive snoring or daytime drowsiness? No   15. Do you have any artifical heart valves or other implanted medical devices like a pacemaker, defibrillator, or continuous glucose monitor? No   16. Do you have artificial joints? No   17. Are you allergic to latex? No       Health Care Directive:  Patient does not have a Health Care Directive or Living Will: Discussed advance care planning with patient; however, patient declined at this time.    Preoperative Review of :   reviewed - no record of controlled substances prescribed.          Review of Systems  CONSTITUTIONAL: NEGATIVE for fever, chills, change in weight  ENT/MOUTH: NEGATIVE for ear, mouth and throat problems  RESP: NEGATIVE for significant cough or SOB  CV: NEGATIVE for chest pain, palpitations or peripheral edema    Patient Active Problem List    Diagnosis Date Noted     Carpal tunnel syndrome of left wrist 01/09/2020     Priority: Medium     Sinusitis 01/07/2015     Priority:  Medium     Anxiety 03/04/2014     Priority: Medium     Drug abuse (H) 10/14/2013     Priority: Medium     Hyperlipidemia 10/14/2013     Priority: Medium     Cigarette smoker 02/27/2013     Priority: Medium     Major depression, recurrent (H) 02/27/2013     Priority: Medium      Past Medical History:   Diagnosis Date     Cigarette nicotine dependence, uncomplicated     Quit 2016     Hyperlipidemia     No Comments Provided     Major depressive disorder, single episode     No Comments Provided     Uncomplicated alcohol abuse      Past Surgical History:   Procedure Laterality Date     CLOSED REDUCTION, PERCUTANEOUS PINNING FINGER, COMBINED Left 10/23/2020    Procedure: 5th Metacarpal base   PINNING;  Surgeon: Amos Mancia MD;  Location:  OR     COLONOSCOPY  08/22/2010    WNL     Current Outpatient Medications   Medication Sig Dispense Refill     atorvastatin (LIPITOR) 20 MG tablet Take 1 tablet (20 mg) by mouth daily 90 tablet 3     buPROPion (WELLBUTRIN XL) 150 MG 24 hr tablet Take 1 tablet (150 mg) by mouth daily (Patient taking differently: Take 300 mg by mouth daily Pt stated taking 300 mg per Dr Cruz) 90 tablet 3     ibuprofen (ADVIL/MOTRIN) 200 MG tablet Take 400 mg by mouth every 4 hours as needed for mild pain       loratadine (CLARITIN) 10 MG tablet Take 1 tablet (10 mg) by mouth daily 90 tablet 3       No Known Allergies     Social History     Tobacco Use     Smoking status: Current Every Day Smoker     Packs/day: 0.25     Types: Cigarettes     Last attempt to quit: 3/7/2018     Years since quitting: 3.2     Smokeless tobacco: Never Used   Substance Use Topics     Alcohol use: No     Alcohol/week: 0.0 standard drinks     Family History   Problem Relation Age of Onset     Emphysema Father      Other - See Comments Father         Blood clots     Hypertension Mother      Hyperlipidemia Mother      Myocardial Infarction Brother      History   Drug Use Unknown         Objective     /80   Pulse 96  "  Temp 97.8  F (36.6  C)   Resp 20   Ht 1.651 m (5' 5\")   Wt 83 kg (183 lb)   SpO2 96%   BMI 30.45 kg/m      Physical Exam    GENERAL APPEARANCE: healthy, alert and no distress     EYES: EOMI,  PERRL     HENT: ear canals and TM's normal and nose and mouth without ulcers or lesions     NECK: no adenopathy,     RESP: lungs clear to auscultation - no rales, rhonchi or wheezes     CV: regular rates and rhythm, normal S1 S2, no S3 or S4 and no murmur, click or rub     ABDOMEN:  soft, nontender, no HSM or masses and bowel sounds normal     MS: extremities normal- no gross deformities noted, no evidence of inflammation in joints, FROM in all extremities.     SKIN: no suspicious lesions or rashes     NEURO: Normal strength and tone, sensory exam grossly normal, mentation intact and speech normal     PSYCH: mentation appears normal. and affect normal/bright     LYMPHATICS: No cervical adenopathy    Recent Labs   Lab Test 10/20/20  1049 01/09/20  1137   HGB 13.8  --    NA  --  138   POTASSIUM  --  4.2   CR  --  1.01        Diagnostics:  Recent Results (from the past 24 hour(s))   EKG 12-lead, tracing only (Same Day)    Collection Time: 05/25/21 10:53 AM   Result Value Ref Range    Interpretation ECG Click View Image link to view waveform and result    Basic Metabolic Panel    Collection Time: 05/25/21 11:07 AM   Result Value Ref Range    Sodium 136 134 - 144 mmol/L    Potassium 4.7 3.5 - 5.1 mmol/L    Chloride 101 98 - 107 mmol/L    Carbon Dioxide 21 21 - 31 mmol/L    Anion Gap 14 3 - 14 mmol/L    Glucose 89 70 - 105 mg/dL    Urea Nitrogen 16 7 - 25 mg/dL    Creatinine 1.00 0.70 - 1.30 mg/dL    GFR Estimate 76 >60 mL/min/[1.73_m2]    GFR Estimate If Black >90 >60 mL/min/[1.73_m2]    Calcium 9.8 8.6 - 10.3 mg/dL      EKG: appears normal, NSR, normal axis, normal intervals, no acute ST/T changes c/w ischemia    Revised Cardiac Risk Index (RCRI):  The patient has the following serious cardiovascular risks for perioperative " complications:   - No serious cardiac risks = 0 points     RCRI Interpretation: 0 points: Class I (very low risk - 0.4% complication rate)           Signed Electronically by: Taye Langford MD  Copy of this evaluation report is provided to requesting physician.       Home 2024 07:00

## (undated) DEVICE — GLOVE ESTEEM POWDER FREE SMT 8.5 2D72PT85

## (undated) DEVICE — PACK MAJOR EXTREMITY SOP15MEFCA

## (undated) DEVICE — TOURNIQUET SGL BLADDER 18"X4" RED 5921-218-135

## (undated) DEVICE — LIGHT HANDLES PLASTIC

## (undated) DEVICE — SOL WATER 1500ML

## (undated) DEVICE — SU ETHILON 4-0 FS-2 18" 662H

## (undated) DEVICE — DRAPE STERI TOWEL LG 1010

## (undated) DEVICE — DRSG KERLIX SUPER SPONGE 7310

## (undated) DEVICE — BLADE KNIFE SURG 15 371115

## (undated) DEVICE — SU ETHILON 4-0 FS-2 18" 662G

## (undated) DEVICE — SYR 10ML FINGER CONTROL W/O NDL 309695

## (undated) DEVICE — BNDG ELASTIC 2"X5YDS UNSTERILE 6611-20

## (undated) DEVICE — CAST PADDING 2" COTTON WEBRIL UNSTERILE 9082

## (undated) DEVICE — NDL 25GA 1.5" 305127

## (undated) DEVICE — DRAPE C-ARM PACK 9"

## (undated) DEVICE — GLOVE PROTEXIS POWDER FREE SMT 8.5 2D72PT85X

## (undated) DEVICE — DRSG XEROFORM 1X8"

## (undated) DEVICE — BNDG ELASTIC 3"X5YDS UNSTERILE 6611-30

## (undated) DEVICE — GLOVE PROTEXIS PI ORTHO PF 8.5 2D73HT76

## (undated) DEVICE — DRSG GAUZE 4X4" 3033

## (undated) DEVICE — GLOVE SENSICARE 8.5 MSG1085 LATEX FREE

## (undated) DEVICE — COVER LIGHT HANDLE LT-F02

## (undated) DEVICE — PREP CHLORAPREP 26ML TINTED ORANGE  260815

## (undated) DEVICE — GLOVE BIOGEL PI SZ 8.5 40885

## (undated) DEVICE — DRSG KERLIX 2 1/4"X3YDS ROLL 6720

## (undated) DEVICE — DRSG ADAPTIC 3X3" 2012

## (undated) DEVICE — PREP CHLORAPREP ORANGE 3ML  260415

## (undated) DEVICE — ESU GROUND PAD ADULT W/CORD E7507

## (undated) RX ORDER — GINSENG 100 MG
CAPSULE ORAL
Status: DISPENSED
Start: 2021-06-14

## (undated) RX ORDER — BUPIVACAINE HYDROCHLORIDE AND EPINEPHRINE 5; 5 MG/ML; UG/ML
INJECTION, SOLUTION PERINEURAL
Status: DISPENSED
Start: 2023-03-01

## (undated) RX ORDER — LIDOCAINE HYDROCHLORIDE 20 MG/ML
INJECTION, SOLUTION EPIDURAL; INFILTRATION; INTRACAUDAL; PERINEURAL
Status: DISPENSED
Start: 2021-06-14

## (undated) RX ORDER — LIDOCAINE HYDROCHLORIDE 10 MG/ML
INJECTION, SOLUTION INFILTRATION; PERINEURAL
Status: DISPENSED
Start: 2020-10-23

## (undated) RX ORDER — PROPOFOL 10 MG/ML
INJECTION, EMULSION INTRAVENOUS
Status: DISPENSED
Start: 2021-06-14

## (undated) RX ORDER — DEXAMETHASONE SODIUM PHOSPHATE 4 MG/ML
INJECTION, SOLUTION INTRA-ARTICULAR; INTRALESIONAL; INTRAMUSCULAR; INTRAVENOUS; SOFT TISSUE
Status: DISPENSED
Start: 2021-06-14

## (undated) RX ORDER — BUPIVACAINE HYDROCHLORIDE 2.5 MG/ML
INJECTION, SOLUTION EPIDURAL; INFILTRATION; INTRACAUDAL
Status: DISPENSED
Start: 2020-10-23

## (undated) RX ORDER — CEFAZOLIN SODIUM 2 G/100ML
INJECTION, SOLUTION INTRAVENOUS
Status: DISPENSED
Start: 2020-10-23

## (undated) RX ORDER — BUPIVACAINE HYDROCHLORIDE AND EPINEPHRINE 5; 5 MG/ML; UG/ML
INJECTION, SOLUTION EPIDURAL; INTRACAUDAL; PERINEURAL
Status: DISPENSED
Start: 2023-03-01

## (undated) RX ORDER — PROPOFOL 10 MG/ML
INJECTION, EMULSION INTRAVENOUS
Status: DISPENSED
Start: 2020-10-23

## (undated) RX ORDER — KETOROLAC TROMETHAMINE 30 MG/ML
INJECTION, SOLUTION INTRAMUSCULAR; INTRAVENOUS
Status: DISPENSED
Start: 2020-10-23

## (undated) RX ORDER — ONDANSETRON 2 MG/ML
INJECTION INTRAMUSCULAR; INTRAVENOUS
Status: DISPENSED
Start: 2021-06-14

## (undated) RX ORDER — BACITRACIN ZINC 500 [USP'U]/G
OINTMENT TOPICAL
Status: DISPENSED
Start: 2020-10-23

## (undated) RX ORDER — KETOROLAC TROMETHAMINE 30 MG/ML
INJECTION, SOLUTION INTRAMUSCULAR; INTRAVENOUS
Status: DISPENSED
Start: 2021-06-14

## (undated) RX ORDER — FENTANYL CITRATE 50 UG/ML
INJECTION, SOLUTION INTRAMUSCULAR; INTRAVENOUS
Status: DISPENSED
Start: 2020-10-23

## (undated) RX ORDER — BUPIVACAINE HYDROCHLORIDE 2.5 MG/ML
INJECTION, SOLUTION EPIDURAL; INFILTRATION; INTRACAUDAL
Status: DISPENSED
Start: 2021-06-14

## (undated) RX ORDER — HYDROCODONE BITARTRATE AND ACETAMINOPHEN 5; 325 MG/1; MG/1
TABLET ORAL
Status: DISPENSED
Start: 2021-06-14